# Patient Record
Sex: FEMALE | Race: WHITE | NOT HISPANIC OR LATINO | URBAN - METROPOLITAN AREA
[De-identification: names, ages, dates, MRNs, and addresses within clinical notes are randomized per-mention and may not be internally consistent; named-entity substitution may affect disease eponyms.]

---

## 2020-07-25 ENCOUNTER — TELEPHONE ENCOUNTER (OUTPATIENT)
Dept: URBAN - METROPOLITAN AREA CLINIC 13 | Facility: CLINIC | Age: 60
End: 2020-07-25

## 2020-07-25 RX ORDER — PROGESTERONE 200 MG/1
TAKE 1 CAPSULE DAILY CAPSULE, LIQUID FILLED ORAL
Refills: 0 | OUTPATIENT
Start: 2013-09-10 | End: 2016-11-29

## 2020-07-25 RX ORDER — ADALIMUMAB 40MG/0.4ML
INJECT SQ WEEKLY AS DIRECTED KIT SUBCUTANEOUS
Qty: 12 | Refills: 3 | OUTPATIENT
Start: 2019-02-08 | End: 2019-12-12

## 2020-07-25 RX ORDER — CYANOCOBALAMIN, ISOPROPYL ALCOHOL 1000MCG/ML
INJECT 1 ML WEEKLY ONCE A WEEK X 4 WEEKS, THEN ONCE A MONTH KIT INJECTION
Qty: 4 | Refills: 1 | OUTPATIENT
Start: 2018-12-06 | End: 2019-12-12

## 2020-07-25 RX ORDER — AZITHROMYCIN DIHYDRATE 250 MG/1
TAKE 2 TABLETS ON DAY 1 THEN TAKE 1 TABLET A DAY FOR 4 DAYS TABLET, FILM COATED ORAL
Qty: 6 | Refills: 0 | OUTPATIENT
Start: 2012-12-16 | End: 2013-02-05

## 2020-07-25 RX ORDER — CLOTRIMAZOLE 10 MG/1
ALLOW 1 TROCHE TO DISSOLVE SLOWLY IN MOUTH 4 TIMES DAILY LOZENGE ORAL
Qty: 56 | Refills: 0 | OUTPATIENT
Start: 2012-05-25 | End: 2012-06-14

## 2020-07-25 RX ORDER — ESTRADIOL 0.75 MG/1.25G
APPLY DAILY AS DIRECTED GEL, METERED TOPICAL
Qty: 50 | Refills: 0 | OUTPATIENT
Start: 2013-09-11 | End: 2015-12-15

## 2020-07-25 RX ORDER — DEXLANSOPRAZOLE 60 MG/1
TAKE 1 CAPSULE DAILY EVERY MORNING BEFORE BREAKFAST CAPSULE, DELAYED RELEASE ORAL
Qty: 30 | Refills: 5 | OUTPATIENT
Start: 2015-12-15 | End: 2016-03-18

## 2020-07-25 RX ORDER — PHENOBARBITAL, HYOSCYAMINE SULFATE, ATROPINE SULFATE, SCOPOLAMINE HYDROBROMIDE 16.2; .1037; .0194; .0065 MG/1; MG/1; MG/1; MG/1
TAKE 1 TABLET AS NEEDED TABLET ORAL
Refills: 0 | OUTPATIENT
Start: 2009-07-22 | End: 2010-09-15

## 2020-07-25 RX ORDER — DEXLANSOPRAZOLE 30 MG/1
TAKE 1 CAPSULE DAILY EVERY MORNING BEFORE BREAKFAST CAPSULE, DELAYED RELEASE ORAL
Refills: 0 | OUTPATIENT
End: 2012-09-10

## 2020-07-25 RX ORDER — MESALAMINE 800 MG/1
TAKE TWO TABLETS BY MOUTH THREE TIMES A DAY TABLET, DELAYED RELEASE ORAL
Qty: 180 | Refills: 1 | OUTPATIENT
Start: 2013-01-02 | End: 2013-08-19

## 2020-07-25 RX ORDER — CONJUGATED ESTROGENS 0.62 MG/G
INSERT 0.5 GRAM INTRAVAGINALLY DAILY. ( 3 WEEKS ON. 1 WEEK OFF ) CREAM VAGINAL
Refills: 0 | OUTPATIENT
Start: 2019-05-07 | End: 2019-12-12

## 2020-07-25 RX ORDER — AZITHROMYCIN DIHYDRATE 250 MG/1
TAKE 2 TABLETS ON DAY 1 THEN TAKE 1 TABLET A DAY FOR 4 DAYS TABLET, FILM COATED ORAL
Qty: 1 | Refills: 0 | OUTPATIENT
Start: 2016-03-18 | End: 2016-11-29

## 2020-07-25 RX ORDER — LEVOTHYROXINE SODIUM 0.05 MG/1
TAKE 1 TABLET DAILY TABLET ORAL
Refills: 0 | OUTPATIENT
Start: 2013-09-10 | End: 2019-12-12

## 2020-07-25 RX ORDER — CALCIUM CARBONATE 500 MG/1
TAKE 1 TABLET DAILY PRN TABLET, CHEWABLE ORAL
Refills: 0 | OUTPATIENT
End: 2019-12-12

## 2020-07-25 RX ORDER — NITAZOXANIDE 500 MG/1
TAKE 1 TABLET TWICE DAILY FOR 14 DAYS TABLET ORAL
Qty: 28 | Refills: 0 | OUTPATIENT
Start: 2012-05-25 | End: 2012-05-25

## 2020-07-25 RX ORDER — ADALIMUMAB 40MG/0.8ML
INJECT 40 MG UNDER THE SKIN EVERY WEEK KIT SUBCUTANEOUS
Qty: 2 | Refills: 3 | OUTPATIENT
Start: 2015-09-04 | End: 2019-05-09

## 2020-07-25 RX ORDER — PREDNISONE 10 MG/1
TAKE 3 TABLET DAILY TABLET ORAL
Qty: 90 | Refills: 0 | OUTPATIENT
Start: 2013-01-02 | End: 2013-05-06

## 2020-07-25 RX ORDER — MESALAMINE 500 MG/1
TAKE 4 CAPSULES TWICE DAILY CAPSULE ORAL
Qty: 240 | Refills: 3 | OUTPATIENT
Start: 2013-11-13 | End: 2013-12-30

## 2020-07-25 RX ORDER — ESOMEPRAZOLE MAGNESIUM 40 MG/1
TAKE 1 CAPSULE ONCE DAILY CAPSULE, DELAYED RELEASE PELLETS ORAL
Qty: 30 | Refills: 5 | OUTPATIENT
Start: 2016-03-18 | End: 2016-03-18

## 2020-07-25 RX ORDER — CERTOLIZUMAB PEGOL 400 MG
TAKE 1 ML AS NEEDED KIT SUBCUTANEOUS
Refills: 0 | OUTPATIENT
Start: 2009-07-22 | End: 2009-08-11

## 2020-07-25 RX ORDER — CYANOCOBALAMIN 1000 UG/ML
INJECT 1 ML WEEKLY ONCE A WEEK X 4 WEEKS, THEN ONCE A MONTH INJECTION INTRAMUSCULAR; SUBCUTANEOUS
Qty: 4 | Refills: 1 | OUTPATIENT
Start: 2019-01-29 | End: 2019-12-12

## 2020-07-25 RX ORDER — CELECOXIB 50 MG/1
TAKE CAPSULE  PRN CAPSULE ORAL
Refills: 0 | OUTPATIENT
End: 2012-06-14

## 2020-07-25 RX ORDER — MESALAMINE 1.2 G/1
TAKE 2 TABLET DAILY TABLET, DELAYED RELEASE ORAL
Qty: 24 | Refills: 0 | OUTPATIENT
Start: 2013-08-15 | End: 2013-09-16

## 2020-07-25 RX ORDER — HYOSCYAMINE SULFATE 0.12 MG/1
PLACE 1-2 TABLET EVERY 6 HOURS PRN ABD PAIN TABLET, ORALLY DISINTEGRATING ORAL
Qty: 90 | Refills: 3 | OUTPATIENT
Start: 2013-09-16 | End: 2014-03-21

## 2020-07-25 RX ORDER — AMOXICILLIN AND CLAVULANATE POTASSIUM 875; 125 MG/1; MG/1
TAKE 1 TABLET TWICE DAILY TABLET, FILM COATED ORAL
Qty: 28 | Refills: 0 | OUTPATIENT
Start: 2012-02-29 | End: 2013-08-13

## 2020-07-25 RX ORDER — THYROID, PORCINE 90 MG/1
TABLET ORAL
Qty: 30 | Refills: 0 | OUTPATIENT
Start: 2012-07-31 | End: 2012-12-10

## 2020-07-25 RX ORDER — RIFAXIMIN 550 MG/1
TAKE 1 TABLET 3 TIMES DAILY TABLET ORAL
Qty: 42 | Refills: 2 | OUTPATIENT
Start: 2019-05-13 | End: 2019-12-12

## 2020-07-25 RX ORDER — PHENOBARBITAL, HYOSCYAMINE SULFATE, ATROPINE SULFATE, SCOPOLAMINE HYDROBROMIDE 16.2; .1037; .0194; .0065 MG/1; MG/1; MG/1; MG/1
TAKE 1 TABLET AS NEEDED UNKNOWN STRENGTH TABLET ORAL
Refills: 0 | OUTPATIENT
Start: 2010-09-15 | End: 2011-05-18

## 2020-07-25 RX ORDER — ESOMEPRAZOLE MAGNESIUM 40 MG
TAKE 1 CAPSULE DAILY CAPSULE,DELAYED RELEASE (ENTERIC COATED) ORAL
Qty: 30 | Refills: 6 | OUTPATIENT
Start: 2013-10-29 | End: 2015-12-15

## 2020-07-25 RX ORDER — PREDNISONE 10 MG/1
TAKE 4 TABLETS DAILY TABLET ORAL
Qty: 120 | Refills: 2 | OUTPATIENT
Start: 2012-04-11 | End: 2012-09-10

## 2020-07-25 RX ORDER — AMLODIPINE BESYLATE 5 MG/1
TAKE 1 TABLET DAILY TABLET ORAL
Refills: 0 | OUTPATIENT
Start: 2013-04-21 | End: 2017-06-09

## 2020-07-25 RX ORDER — OMEPRAZOLE 40 MG/1
TAKE 1 CAPSULE DAILY CAPSULE, DELAYED RELEASE ORAL
Qty: 30 | Refills: 5 | OUTPATIENT
Start: 2016-03-18 | End: 2018-12-06

## 2020-07-26 ENCOUNTER — TELEPHONE ENCOUNTER (OUTPATIENT)
Dept: URBAN - METROPOLITAN AREA CLINIC 13 | Facility: CLINIC | Age: 60
End: 2020-07-26

## 2020-07-26 RX ORDER — FLUCONAZOLE 150 MG/1
TAKE ONE TABLET NOW TABLET ORAL
Qty: 1 | Refills: 0 | Status: ACTIVE | COMMUNITY
Start: 2012-08-01

## 2020-07-26 RX ORDER — LEVOFLOXACIN 500 MG/1
TABLET, FILM COATED ORAL
Qty: 10 | Refills: 0 | Status: ACTIVE | COMMUNITY
Start: 2012-01-05

## 2020-07-26 RX ORDER — HYOSCYAMINE SULFATE 0.12 MG/1
TABLET ORAL
Qty: 90 | Refills: 0 | Status: ACTIVE | COMMUNITY
Start: 2013-09-16

## 2020-07-26 RX ORDER — HYDROCODONE BITARTRATE AND ACETAMINOPHEN 7.5; 325 MG/1; MG/1
TABLET ORAL
Qty: 45 | Refills: 0 | Status: ACTIVE | COMMUNITY
Start: 2014-08-18

## 2020-07-26 RX ORDER — OXYCODONE AND ACETAMINOPHEN 5; 325 MG/1; MG/1
TAKE 1 TO 2 TABLETS BY MOUTH EVERY 4 HOURS AS NEEDED FOR PAIN TABLET ORAL
Qty: 40 | Refills: 0 | Status: ACTIVE | COMMUNITY
Start: 2011-09-30

## 2020-07-26 RX ORDER — AMLODIPINE BESYLATE 5 MG/1
TAKE 1 TABLET DAILY TABLET ORAL
Qty: 1 | Refills: 3 | Status: ACTIVE | COMMUNITY

## 2020-07-26 RX ORDER — AMLODIPINE BESYLATE 5 MG/1
TAKE 1 TABLET BY MOUTH EVERY DAY TABLET ORAL
Qty: 30 | Refills: 0 | Status: ACTIVE | COMMUNITY
Start: 2013-03-26

## 2020-07-26 RX ORDER — TRIAZOLAM 0.25 MG/1
TABLET ORAL
Qty: 5 | Refills: 0 | Status: ACTIVE | COMMUNITY
Start: 2019-07-15

## 2020-07-26 RX ORDER — TRIAMCINOLONE ACETONIDE 1 MG/G
CREAM TOPICAL
Qty: 255 | Refills: 0 | Status: ACTIVE | COMMUNITY
Start: 2013-03-29

## 2020-07-26 RX ORDER — ADALIMUMAB 40MG/0.8ML
KIT SUBCUTANEOUS
Qty: 4 | Refills: 0 | Status: ACTIVE | COMMUNITY
Start: 2013-11-01

## 2020-07-26 RX ORDER — FLUCONAZOLE 100 MG/1
TAKE 1 TABLET EVERY DAY FOR 10 DAYS TABLET ORAL
Qty: 10 | Refills: 0 | Status: ACTIVE | COMMUNITY
Start: 2011-07-19

## 2020-07-26 RX ORDER — CLINDAMYCIN PHOSPHATE 100 MG/1
SUPPOSITORY VAGINAL
Qty: 3 | Refills: 0 | Status: ACTIVE | COMMUNITY
Start: 2011-05-10

## 2020-07-26 RX ORDER — AMOXICILLIN 875 MG/1
TABLET, FILM COATED ORAL
Qty: 28 | Refills: 0 | Status: ACTIVE | COMMUNITY
Start: 2012-05-25

## 2020-07-26 RX ORDER — CONJUGATED ESTROGENS 0.62 MG/G
CREAM VAGINAL
Qty: 30 | Refills: 0 | Status: ACTIVE | COMMUNITY
Start: 2018-04-05

## 2020-07-26 RX ORDER — AMOXICILLIN 500 MG/1
CAPSULE ORAL
Qty: 21 | Refills: 0 | Status: ACTIVE | COMMUNITY
Start: 2019-12-02

## 2020-07-26 RX ORDER — PANTOPRAZOLE SODIUM 40 MG/1
TAKE 1 TABLET TWICE DAILY 30 MINUTES BEFORE BREAKFAST AND DINNER TABLET, DELAYED RELEASE ORAL
Qty: 60 | Refills: 3 | Status: ACTIVE | COMMUNITY
Start: 2020-05-21

## 2020-07-26 RX ORDER — ETODOLAC 400 MG/1
TABLET, COATED ORAL
Qty: 20 | Refills: 0 | Status: ACTIVE | COMMUNITY
Start: 2019-07-15

## 2020-07-26 RX ORDER — BUTALBITAL, ACETAMINOPHEN, AND CAFFEINE 50; 325; 40 MG/1; MG/1; MG/1
TAKE ONE TABLET EVERY 6 HOURS AS NEEDED FOR HEADACHE TABLET ORAL
Qty: 30 | Refills: 0 | Status: ACTIVE | COMMUNITY
Start: 2011-09-06

## 2020-07-26 RX ORDER — ADALIMUMAB 40MG/0.4ML
INJECT 40MG WEEKLY KIT SUBCUTANEOUS
Qty: 1 | Refills: 11 | Status: ACTIVE | COMMUNITY
Start: 2020-03-06

## 2020-07-26 RX ORDER — OSELTAMIVIR PHOSPHATE 75 MG/1
CAPSULE ORAL
Qty: 10 | Refills: 0 | Status: ACTIVE | COMMUNITY
Start: 2018-01-12

## 2020-07-26 RX ORDER — AZITHROMYCIN DIHYDRATE 500 MG/1
TABLET, FILM COATED ORAL
Qty: 3 | Refills: 0 | Status: ACTIVE | COMMUNITY
Start: 2018-01-12

## 2020-07-26 RX ORDER — MELOXICAM 7.5 MG/1
TABLET ORAL
Qty: 30 | Refills: 0 | Status: ACTIVE | COMMUNITY
Start: 2012-02-02

## 2020-07-26 RX ORDER — CERTOLIZUMAB PEGOL 200 MG/ML
INJECTION, SOLUTION SUBCUTANEOUS
Qty: 1 | Refills: 0 | Status: ACTIVE | COMMUNITY
Start: 2012-10-01

## 2020-07-26 RX ORDER — FLUCONAZOLE 150 MG/1
TABLET ORAL
Qty: 7 | Refills: 0 | Status: ACTIVE | COMMUNITY
Start: 2012-01-31

## 2020-07-26 RX ORDER — PROGESTERONE 200 MG/1
CAPSULE, LIQUID FILLED ORAL
Qty: 90 | Refills: 0 | Status: ACTIVE | COMMUNITY
Start: 2013-09-10

## 2020-07-26 RX ORDER — OXYCODONE AND ACETAMINOPHEN 5; 325 MG/1; MG/1
TAKE 1 TO 2 TABLETS BY MOUTH EVERY 3 TO 4 HOURS AS NEEDED FOR PAIN TABLET ORAL
Qty: 40 | Refills: 0 | Status: ACTIVE | COMMUNITY
Start: 2012-07-10

## 2020-07-26 RX ORDER — DICLOFENAC SODIUM 1 %
APPLY THREE TIMES A DAY GEL (GRAM) TOPICAL
Qty: 255 | Refills: 0 | Status: ACTIVE | COMMUNITY
Start: 2011-07-06

## 2020-08-26 ENCOUNTER — ERX REFILL RESPONSE (OUTPATIENT)
Age: 60
End: 2020-08-26

## 2020-08-26 RX ORDER — ADALIMUMAB 40MG/0.4ML
INJECT 1 SYRINGE UNDER THE SKIN EVERY 7 DAYS KIT SUBCUTANEOUS
Qty: 4 | Refills: 5

## 2020-10-08 ENCOUNTER — TELEPHONE ENCOUNTER (OUTPATIENT)
Dept: URBAN - METROPOLITAN AREA CLINIC 113 | Facility: CLINIC | Age: 60
End: 2020-10-08

## 2020-10-09 ENCOUNTER — OFFICE VISIT (OUTPATIENT)
Dept: URBAN - METROPOLITAN AREA CLINIC 113 | Facility: CLINIC | Age: 60
End: 2020-10-09

## 2020-12-18 ENCOUNTER — OFFICE VISIT (OUTPATIENT)
Dept: URBAN - METROPOLITAN AREA CLINIC 113 | Facility: CLINIC | Age: 60
End: 2020-12-18
Payer: COMMERCIAL

## 2020-12-18 ENCOUNTER — WEB ENCOUNTER (OUTPATIENT)
Dept: URBAN - METROPOLITAN AREA CLINIC 113 | Facility: CLINIC | Age: 60
End: 2020-12-18

## 2020-12-18 VITALS
WEIGHT: 158 LBS | TEMPERATURE: 98.8 F | SYSTOLIC BLOOD PRESSURE: 138 MMHG | HEART RATE: 11 BPM | DIASTOLIC BLOOD PRESSURE: 92 MMHG | BODY MASS INDEX: 26.33 KG/M2 | HEIGHT: 65 IN | RESPIRATION RATE: 18 BRPM

## 2020-12-18 DIAGNOSIS — Z79.899 HIGH RISK MEDICATIONS (NOT ANTICOAGULANTS) LONG-TERM USE: ICD-10-CM

## 2020-12-18 DIAGNOSIS — E53.8 B12 DEFICIENCY: ICD-10-CM

## 2020-12-18 DIAGNOSIS — E55.9 VITAMIN D DEFICIENCY: ICD-10-CM

## 2020-12-18 DIAGNOSIS — K50.813 CROHN'S DISEASE OF BOTH SMALL AND LARGE INTESTINE WITH FISTULA: ICD-10-CM

## 2020-12-18 PROCEDURE — 99214 OFFICE O/P EST MOD 30 MIN: CPT | Performed by: INTERNAL MEDICINE

## 2020-12-18 RX ORDER — AMLODIPINE BESYLATE 5 MG/1
TAKE 1 TABLET DAILY TABLET ORAL
Qty: 1 | Refills: 3 | Status: ACTIVE | COMMUNITY

## 2020-12-18 RX ORDER — BUTALBITAL, ACETAMINOPHEN, AND CAFFEINE 50; 325; 40 MG/1; MG/1; MG/1
TAKE ONE TABLET EVERY 6 HOURS AS NEEDED FOR HEADACHE TABLET ORAL
Qty: 30 | Refills: 0 | Status: ACTIVE | COMMUNITY
Start: 2011-09-06

## 2020-12-18 RX ORDER — ADALIMUMAB 40MG/0.8ML
KIT SUBCUTANEOUS
Qty: 4 | Refills: 0 | Status: ACTIVE | COMMUNITY
Start: 2013-11-01

## 2020-12-18 RX ORDER — ADALIMUMAB 40MG/0.8ML
KIT SUBCUTANEOUS
OUTPATIENT
Start: 2013-11-01

## 2020-12-18 NOTE — HPI-OTHER HISTORIES
Colonoscopy (4/10/18): perianal fistula, perianal skin tags and anal canal stenosis stable from previous studies, patent ileocolonic anastomosis at 45 cm from the anal verge with mild stenosis and ulceration overall improved from her previous examination, normal neoterminal ilium, sigmoid colon diverticulosis. Biopsies of the anastomotic ulcer showed changes consistent with anastomosis without evidence of dysplasia or malignancy, and random colon biopsy showed no significant abnormality.

## 2020-12-18 NOTE — HPI-TODAY'S VISIT:
Ms. Chaves is a 60-year-old woman with a history of small bowel, colonic and perianal Crohn's disease status post ileocecal resection on Humira 40 mg weekly presenting for follow-up.  She was last seen on 12/12/19 for routine follow-up regarding her perianal and ileocolonic Crohn's disease status post right hemicolectomy (2014) there was doing well bedtime on Humira 40 mg weekly.  Her labs were unremarkable except for a mildly low vitamin D level and an isolated mild elevation ALT.  She was to continue vitamin D 2000 units daily, and continue Humira 40 mg weekly.  Plan was to follow up in 6 months.  She returns now after moving back to [place] from [place].  Her interval history is notable for eliseo COVID-19.  She is taking Humira 40 mg injection every 10 days.  She is overall doing fairly well with occasional right upper quadrant pain that improves taking Bentyl.  She has about 6 loose bowel movements per day with some urgency and rare episodes of fecal incontinence.  She occasionally uses Imodium.  No BRBPR or melena.  She denies any NSAID use.  She also denies any heartburn or dysphagia.

## 2021-01-10 ENCOUNTER — LAB OUTSIDE AN ENCOUNTER (OUTPATIENT)
Dept: URBAN - METROPOLITAN AREA CLINIC 113 | Facility: CLINIC | Age: 61
End: 2021-01-10

## 2021-01-10 ENCOUNTER — TELEPHONE ENCOUNTER (OUTPATIENT)
Dept: URBAN - METROPOLITAN AREA CLINIC 113 | Facility: CLINIC | Age: 61
End: 2021-01-10

## 2021-01-10 RX ORDER — ADALIMUMAB 40MG/0.8ML
KIT SUBCUTANEOUS
Status: ACTIVE | COMMUNITY
Start: 2013-11-01

## 2021-01-10 RX ORDER — BUTALBITAL, ACETAMINOPHEN, AND CAFFEINE 50; 325; 40 MG/1; MG/1; MG/1
TAKE ONE TABLET EVERY 6 HOURS AS NEEDED FOR HEADACHE TABLET ORAL
Qty: 30 | Refills: 0 | Status: ACTIVE | COMMUNITY
Start: 2011-09-06

## 2021-01-10 RX ORDER — AMLODIPINE BESYLATE 5 MG/1
TAKE 1 TABLET DAILY TABLET ORAL
Qty: 1 | Refills: 3 | Status: ACTIVE | COMMUNITY

## 2021-01-10 RX ORDER — SODIUM, POTASSIUM,MAG SULFATES 17.5-3.13G
354 ML SOLUTION, RECONSTITUTED, ORAL ORAL
Qty: 354 ML | Refills: 0 | OUTPATIENT

## 2021-01-13 LAB
A/G RATIO: 1.8
ALBUMIN: 4.2
ALKALINE PHOSPHATASE: 96
ALT (SGPT): 29
AST (SGOT): 30
BASO (ABSOLUTE): 0.1
BASOS: 1
BILIRUBIN, TOTAL: 0.7
BUN/CREATININE RATIO: 17
BUN: 17
C-REACTIVE PROTEIN, QUANT: <1
CALCIUM: 9.2
CARBON DIOXIDE, TOTAL: 19
CHLORIDE: 104
CREATININE: 1.01
EGFR IF AFRICN AM: 71
EGFR IF NONAFRICN AM: 62
EOS (ABSOLUTE): 0.1
EOS: 2
GLOBULIN, TOTAL: 2.4
GLUCOSE: 116
HEMATOCRIT: 43.6
HEMATOLOGY COMMENTS:: (no result)
HEMOGLOBIN: 15
IMMATURE CELLS: (no result)
IMMATURE GRANS (ABS): 0
IMMATURE GRANULOCYTES: 0
LYMPHS (ABSOLUTE): 2.2
LYMPHS: 44
MCH: 33.3
MCHC: 34.4
MCV: 97
MONOCYTES(ABSOLUTE): 0.6
MONOCYTES: 12
NEUTROPHILS (ABSOLUTE): 2
NEUTROPHILS: 41
NRBC: (no result)
PLATELETS: 323
POTASSIUM: 4.5
PROTEIN, TOTAL: 6.6
RBC: 4.5
RDW: 12.5
SODIUM: 137
VITAMIN B12: 714
VITAMIN D, 25-HYDROXY: 28
WBC: 4.9

## 2021-02-02 ENCOUNTER — OFFICE VISIT (OUTPATIENT)
Dept: URBAN - METROPOLITAN AREA SURGERY CENTER 25 | Facility: SURGERY CENTER | Age: 61
End: 2021-02-02
Payer: COMMERCIAL

## 2021-02-02 ENCOUNTER — CLAIMS CREATED FROM THE CLAIM WINDOW (OUTPATIENT)
Dept: URBAN - METROPOLITAN AREA CLINIC 4 | Facility: CLINIC | Age: 61
End: 2021-02-02
Payer: COMMERCIAL

## 2021-02-02 DIAGNOSIS — K63.89 OTHER SPECIFIED DISEASES OF INTESTINE: ICD-10-CM

## 2021-02-02 DIAGNOSIS — K63.3 APHTHOUS ULCER OF COLON: ICD-10-CM

## 2021-02-02 DIAGNOSIS — K52.9 ENTEROCOLITIS: ICD-10-CM

## 2021-02-02 DIAGNOSIS — K63.3 ULCER OF INTESTINE: ICD-10-CM

## 2021-02-02 DIAGNOSIS — K50.112 CROHN'S COLITIS, WITH INTESTINAL OBSTRUCTION: ICD-10-CM

## 2021-02-02 PROCEDURE — 88342 IMHCHEM/IMCYTCHM 1ST ANTB: CPT | Performed by: PATHOLOGY

## 2021-02-02 PROCEDURE — G8907 PT DOC NO EVENTS ON DISCHARG: HCPCS | Performed by: INTERNAL MEDICINE

## 2021-02-02 PROCEDURE — 88305 TISSUE EXAM BY PATHOLOGIST: CPT | Performed by: PATHOLOGY

## 2021-02-02 PROCEDURE — 45380 COLONOSCOPY AND BIOPSY: CPT | Performed by: INTERNAL MEDICINE

## 2021-02-02 PROCEDURE — 88341 IMHCHEM/IMCYTCHM EA ADD ANTB: CPT | Performed by: PATHOLOGY

## 2021-02-02 RX ORDER — BUTALBITAL, ACETAMINOPHEN, AND CAFFEINE 50; 325; 40 MG/1; MG/1; MG/1
TAKE ONE TABLET EVERY 6 HOURS AS NEEDED FOR HEADACHE TABLET ORAL
Qty: 30 | Refills: 0 | Status: ACTIVE | COMMUNITY
Start: 2011-09-06

## 2021-02-02 RX ORDER — ADALIMUMAB 40MG/0.8ML
KIT SUBCUTANEOUS
Status: ACTIVE | COMMUNITY
Start: 2013-11-01

## 2021-02-02 RX ORDER — SODIUM, POTASSIUM,MAG SULFATES 17.5-3.13G
354 ML SOLUTION, RECONSTITUTED, ORAL ORAL
Qty: 354 ML | Refills: 0 | Status: ACTIVE | COMMUNITY

## 2021-02-02 RX ORDER — AMLODIPINE BESYLATE 5 MG/1
TAKE 1 TABLET DAILY TABLET ORAL
Qty: 1 | Refills: 3 | Status: ACTIVE | COMMUNITY

## 2021-03-15 ENCOUNTER — TELEPHONE ENCOUNTER (OUTPATIENT)
Dept: URBAN - METROPOLITAN AREA CLINIC 113 | Facility: CLINIC | Age: 61
End: 2021-03-15

## 2021-03-15 RX ORDER — ADALIMUMAB 40MG/0.8ML
INJECT ONE PEN KIT SUBCUTANEOUS WEEKLY
Qty: 4 | Refills: 6
Start: 2013-11-01 | End: 2021-10-11

## 2021-04-08 ENCOUNTER — OFFICE VISIT (OUTPATIENT)
Dept: URBAN - METROPOLITAN AREA CLINIC 113 | Facility: CLINIC | Age: 61
End: 2021-04-08
Payer: COMMERCIAL

## 2021-04-08 VITALS
DIASTOLIC BLOOD PRESSURE: 82 MMHG | HEIGHT: 65 IN | WEIGHT: 161 LBS | HEART RATE: 92 BPM | BODY MASS INDEX: 26.82 KG/M2 | TEMPERATURE: 97.5 F | SYSTOLIC BLOOD PRESSURE: 124 MMHG

## 2021-04-08 DIAGNOSIS — Z79.899 HIGH RISK MEDICATIONS (NOT ANTICOAGULANTS) LONG-TERM USE: ICD-10-CM

## 2021-04-08 DIAGNOSIS — E55.9 VITAMIN D DEFICIENCY: ICD-10-CM

## 2021-04-08 DIAGNOSIS — K50.813 CROHN'S DISEASE OF BOTH SMALL AND LARGE INTESTINE WITH FISTULA: ICD-10-CM

## 2021-04-08 PROCEDURE — 99214 OFFICE O/P EST MOD 30 MIN: CPT | Performed by: INTERNAL MEDICINE

## 2021-04-08 RX ORDER — AMLODIPINE BESYLATE 5 MG/1
TAKE 1 TABLET DAILY TABLET ORAL
Qty: 1 | Refills: 3 | Status: ACTIVE | COMMUNITY

## 2021-04-08 RX ORDER — ADALIMUMAB 40MG/0.8ML
INJECT ONE PEN KIT SUBCUTANEOUS WEEKLY
Qty: 4 | Refills: 6 | Status: ACTIVE | COMMUNITY
Start: 2013-11-01 | End: 2021-10-11

## 2021-04-08 RX ORDER — SODIUM, POTASSIUM,MAG SULFATES 17.5-3.13G
354 ML SOLUTION, RECONSTITUTED, ORAL ORAL
Qty: 354 ML | Refills: 0 | Status: ON HOLD | COMMUNITY

## 2021-04-08 RX ORDER — ADALIMUMAB 40MG/0.4ML
1 SUBQ Q  WEEK KIT SUBCUTANEOUS
Qty: 4 | Refills: 11 | OUTPATIENT
Start: 2021-04-08 | End: 2022-04-03

## 2021-04-08 RX ORDER — BUTALBITAL, ACETAMINOPHEN, AND CAFFEINE 50; 325; 40 MG/1; MG/1; MG/1
TAKE ONE TABLET EVERY 6 HOURS AS NEEDED FOR HEADACHE TABLET ORAL
Qty: 30 | Refills: 0 | Status: ON HOLD | COMMUNITY
Start: 2011-09-06

## 2021-04-08 NOTE — HPI-TODAY'S VISIT:
Ms. Chaves is a 60-year-old woman with a history of small bowel, colonic and perianal Crohn's disease status post ileocecal resection on Humira 40 mg weekly presenting for follow-up after colonoscopy. The colonoscopy was performed on 2/2/2021 with findings notable for perianal skin tags but no evidence of active perianal Crohn's disease, stable anal stricture, and ileocolonic anastomosis with erythema, ulceration and moderate stenosis could not be readily traversed status post biopsy showing chronic active enterocolitis negative for dysplasia, normal neoterminal ileum on limited evaluation, patchy erythema of the right and left colon status post biopsy showing no significant abnormality, sigmoid colon diverticulosis. She is overall doing fairly well.  She is occasional right upper quadrant abdominal pain that waxes and wanes, nonradiating, and has no modifying factors.  She continues have episodes of fecal seepage and loose bowel movements with urgency.  She estimates about 6-7 bowel movements per day.  She typically will take one half dose of liquid Imodium prior to certain functions such as playing golf.  She is taking Humira 40 mg about every 10 days.  She has not had any issues with the perianal fistula for about 1 year.  She was previously treated with Xifaxan for the diarrhea and reports no improvement. Labs on 1/12/2021 show WBC 4.9, hemoglobin 15, MCV 97, platelets 323, normal basic metabolic panel, normal liver function tests, vitamin D 25 hydroxy 28, vitamin B12 714, C-reactive protein <1.

## 2021-04-14 ENCOUNTER — ERX REFILL RESPONSE (OUTPATIENT)
Dept: URBAN - METROPOLITAN AREA CLINIC 113 | Facility: CLINIC | Age: 61
End: 2021-04-14

## 2021-04-14 RX ORDER — ADALIMUMAB 40MG/0.8ML
INJECT 1 PEN UNDER THE SKIN EVERY 7 DAYS KIT SUBCUTANEOUS
Qty: 4 | Refills: 5

## 2021-05-19 ENCOUNTER — ERX REFILL RESPONSE (OUTPATIENT)
Dept: URBAN - METROPOLITAN AREA CLINIC 113 | Facility: CLINIC | Age: 61
End: 2021-05-19

## 2021-05-19 RX ORDER — PANTOPRAZOLE SODIUM 40 MG/1
TAKE ONE TABLET BY MOUTH TWICE A DAY 30 MINUTES BEFORE BREAKFAST AND DINNER TABLET, DELAYED RELEASE ORAL
Qty: 60 | Refills: 2

## 2021-06-08 ENCOUNTER — TELEPHONE ENCOUNTER (OUTPATIENT)
Dept: URBAN - METROPOLITAN AREA CLINIC 113 | Facility: CLINIC | Age: 61
End: 2021-06-08

## 2021-06-08 RX ORDER — ADALIMUMAB 40MG/0.4ML
1 SUBQ Q  WEEK KIT SUBCUTANEOUS
Qty: 4 | Refills: 11 | Status: ACTIVE | COMMUNITY
Start: 2021-04-08 | End: 2022-04-03

## 2021-06-08 RX ORDER — PANTOPRAZOLE SODIUM 40 MG/1
TAKE ONE TABLET BY MOUTH TWICE A DAY 30 MINUTES BEFORE BREAKFAST AND DINNER TABLET, DELAYED RELEASE ORAL
Qty: 60 | Refills: 2 | Status: ACTIVE | COMMUNITY

## 2021-06-08 RX ORDER — BUTALBITAL, ACETAMINOPHEN, AND CAFFEINE 50; 325; 40 MG/1; MG/1; MG/1
TAKE ONE TABLET EVERY 6 HOURS AS NEEDED FOR HEADACHE TABLET ORAL
Qty: 30 | Refills: 0 | Status: DISCONTINUED | COMMUNITY
Start: 2011-09-06

## 2021-06-08 RX ORDER — ADALIMUMAB 40MG/0.8ML
INJECT 1 PEN UNDER THE SKIN EVERY 7 DAYS KIT SUBCUTANEOUS
Qty: 4 | Refills: 5 | Status: DISCONTINUED | COMMUNITY

## 2021-06-08 RX ORDER — AMLODIPINE BESYLATE 5 MG/1
TAKE 1 TABLET DAILY TABLET ORAL
Qty: 1 | Refills: 3 | Status: ACTIVE | COMMUNITY

## 2021-06-08 RX ORDER — SODIUM, POTASSIUM,MAG SULFATES 17.5-3.13G
354 ML SOLUTION, RECONSTITUTED, ORAL ORAL
Qty: 354 ML | Refills: 0 | Status: DISCONTINUED | COMMUNITY

## 2021-06-08 RX ORDER — ADALIMUMAB 40MG/0.4ML
AS DIRECTED KIT SUBCUTANEOUS
Qty: 4 | Refills: 11
Start: 2021-04-08 | End: 2022-06-03

## 2021-06-14 ENCOUNTER — LAB OUTSIDE AN ENCOUNTER (OUTPATIENT)
Dept: URBAN - METROPOLITAN AREA CLINIC 113 | Facility: CLINIC | Age: 61
End: 2021-06-14

## 2021-07-01 LAB
A/G RATIO: 1.5
ALBUMIN: 4.3
ALKALINE PHOSPHATASE: 101
ALT (SGPT): 29
AST (SGOT): 30
BILIRUBIN, TOTAL: 0.6
BUN/CREATININE RATIO: 12
BUN: 12
C-REACTIVE PROTEIN, QUANT: 1
CALCIUM: 9.5
CARBON DIOXIDE, TOTAL: 21
CHLORIDE: 107
CREATININE: 1
EGFR IF AFRICN AM: 72
EGFR IF NONAFRICN AM: 62
GLOBULIN, TOTAL: 2.9
GLUCOSE: 101
HEMATOCRIT: 45.4
HEMOGLOBIN: 15.1
MCH: 32.8
MCHC: 33.3
MCV: 99
NRBC: (no result)
PLATELETS: 345
POTASSIUM: 4.8
PROTEIN, TOTAL: 7.2
RBC: 4.61
RDW: 12.3
SODIUM: 139
VITAMIN D, 25-HYDROXY: 30.4
WBC: 6.6

## 2021-08-25 ENCOUNTER — ERX REFILL RESPONSE (OUTPATIENT)
Dept: URBAN - METROPOLITAN AREA CLINIC 113 | Facility: CLINIC | Age: 61
End: 2021-08-25

## 2021-08-25 RX ORDER — PANTOPRAZOLE SODIUM 40 MG/1
TAKE ONE TABLET BY MOUTH TWICE A DAY 30 MINUTES BEFORE BREAKFAST AND DINNER TABLET, DELAYED RELEASE ORAL
Qty: 60 | Refills: 2 | OUTPATIENT

## 2021-08-25 RX ORDER — PANTOPRAZOLE SODIUM 40 MG/1
TAKE ONE TABLET BY MOUTH TWICE A DAY 30 MINUTES BEFORE BREAKFAST AND DINNER TABLET, DELAYED RELEASE ORAL
Qty: 60 TABLET | Refills: 6 | OUTPATIENT

## 2021-11-16 ENCOUNTER — ERX REFILL RESPONSE (OUTPATIENT)
Dept: URBAN - METROPOLITAN AREA CLINIC 107 | Facility: CLINIC | Age: 61
End: 2021-11-16

## 2021-11-16 RX ORDER — ADALIMUMAB 40MG/0.4ML
INJECT 1 PEN UNDER THE SKIN EVERY 7 DAYS KIT SUBCUTANEOUS
Qty: 4 | Refills: 5 | OUTPATIENT

## 2021-11-16 RX ORDER — ADALIMUMAB 40MG/0.4ML
AS DIRECTED KIT SUBCUTANEOUS
Qty: 4 | Refills: 11 | OUTPATIENT

## 2022-01-27 ENCOUNTER — TELEPHONE ENCOUNTER (OUTPATIENT)
Dept: URBAN - METROPOLITAN AREA CLINIC 113 | Facility: CLINIC | Age: 62
End: 2022-01-27

## 2022-01-30 LAB
A/G RATIO: 1.4
ALBUMIN: 4.1
ALKALINE PHOSPHATASE: 91
ALT (SGPT): 34
AST (SGOT): 34
BASO (ABSOLUTE): 0.1
BASOS: 1
BILIRUBIN, TOTAL: 0.6
BUN/CREATININE RATIO: 17
BUN: 14
C-REACTIVE PROTEIN, QUANT: 1
CALCIUM: 9
CARBON DIOXIDE, TOTAL: 25
CHLORIDE: 101
CREATININE: 0.82
EGFR IF AFRICN AM: 91
EGFR IF NONAFRICN AM: 79
EOS (ABSOLUTE): 0.1
EOS: 3
GLOBULIN, TOTAL: 3
GLUCOSE: 89
HEMATOCRIT: 41.2
HEMATOLOGY COMMENTS:: (no result)
HEMOGLOBIN: 13.6
IMMATURE CELLS: (no result)
IMMATURE GRANS (ABS): 0
IMMATURE GRANULOCYTES: 0
LYMPHS (ABSOLUTE): 2.5
LYMPHS: 47
MCH: 33.2
MCHC: 33
MCV: 101
MONOCYTES(ABSOLUTE): 0.5
MONOCYTES: 9
NEUTROPHILS (ABSOLUTE): 2.2
NEUTROPHILS: 40
NRBC: (no result)
PLATELETS: 287
POTASSIUM: 4.9
PROTEIN, TOTAL: 7.1
QUANTIFERON CRITERIA: (no result)
QUANTIFERON INCUBATION: (no result)
QUANTIFERON MITOGEN VALUE: >10
QUANTIFERON NIL VALUE: 0.03
QUANTIFERON TB1 AG VALUE: 0.01
QUANTIFERON TB2 AG VALUE: 0.03
QUANTIFERON-TB GOLD PLUS: NEGATIVE
RBC: 4.1
RDW: 12.4
SODIUM: 136
VITAMIN B12: 182
VITAMIN D, 25-HYDROXY: 25.9
WBC: 5.5

## 2022-01-31 ENCOUNTER — OFFICE VISIT (OUTPATIENT)
Dept: URBAN - METROPOLITAN AREA CLINIC 113 | Facility: CLINIC | Age: 62
End: 2022-01-31
Payer: COMMERCIAL

## 2022-01-31 VITALS
BODY MASS INDEX: 26.82 KG/M2 | HEART RATE: 107 BPM | WEIGHT: 161 LBS | SYSTOLIC BLOOD PRESSURE: 123 MMHG | HEIGHT: 65 IN | DIASTOLIC BLOOD PRESSURE: 86 MMHG | TEMPERATURE: 97.8 F | RESPIRATION RATE: 18 BRPM

## 2022-01-31 DIAGNOSIS — K21.9 GASTROESOPHAGEAL REFLUX DISEASE WITHOUT ESOPHAGITIS: ICD-10-CM

## 2022-01-31 DIAGNOSIS — E55.9 VITAMIN D DEFICIENCY: ICD-10-CM

## 2022-01-31 DIAGNOSIS — K50.813 CROHN'S DISEASE OF BOTH SMALL AND LARGE INTESTINE WITH FISTULA: ICD-10-CM

## 2022-01-31 DIAGNOSIS — E53.8 B12 DEFICIENCY: ICD-10-CM

## 2022-01-31 DIAGNOSIS — Z79.899 HIGH RISK MEDICATIONS (NOT ANTICOAGULANTS) LONG-TERM USE: ICD-10-CM

## 2022-01-31 DIAGNOSIS — K63.89 SMALL INTESTINAL BACTERIAL OVERGROWTH (SIBO): ICD-10-CM

## 2022-01-31 PROBLEM — 446081009: Status: ACTIVE | Noted: 2022-01-31

## 2022-01-31 PROCEDURE — 99214 OFFICE O/P EST MOD 30 MIN: CPT | Performed by: INTERNAL MEDICINE

## 2022-01-31 RX ORDER — ADALIMUMAB 40MG/0.4ML
INJECT 1 PEN UNDER THE SKIN EVERY 7 DAYS KIT SUBCUTANEOUS
Qty: 4 | Refills: 5 | Status: ACTIVE | COMMUNITY

## 2022-01-31 RX ORDER — ADALIMUMAB 40MG/0.4ML
INJECT 1 PEN UNDER THE SKIN EVERY 7 DAYS KIT SUBCUTANEOUS
OUTPATIENT

## 2022-01-31 RX ORDER — ERGOCALCIFEROL 1.25 MG/1
1 CAPSULE CAPSULE, LIQUID FILLED ORAL
Qty: 8 | Refills: 0 | OUTPATIENT
Start: 2022-01-31 | End: 2022-04-01

## 2022-01-31 RX ORDER — CYANOCOBALAMIN 1000 UG/ML
1 ML INJECTION INTRAMUSCULAR; SUBCUTANEOUS
Qty: 12 | Refills: 1 | OUTPATIENT
Start: 2022-01-31 | End: 2022-04-01

## 2022-01-31 RX ORDER — RIFAXIMIN 550 MG/1
1 TABLET TABLET ORAL THREE TIMES A DAY
Qty: 42 TABLET | Refills: 1 | OUTPATIENT
Start: 2022-01-31 | End: 2022-02-28

## 2022-01-31 RX ORDER — AMLODIPINE BESYLATE 5 MG/1
TAKE 1 TABLET DAILY TABLET ORAL
Qty: 1 | Refills: 3 | Status: ACTIVE | COMMUNITY

## 2022-01-31 RX ORDER — PANTOPRAZOLE SODIUM 40 MG/1
TAKE ONE TABLET BY MOUTH TWICE A DAY 30 MINUTES BEFORE BREAKFAST AND DINNER TABLET, DELAYED RELEASE ORAL
OUTPATIENT

## 2022-01-31 RX ORDER — PANTOPRAZOLE SODIUM 40 MG/1
TAKE ONE TABLET BY MOUTH TWICE A DAY 30 MINUTES BEFORE BREAKFAST AND DINNER TABLET, DELAYED RELEASE ORAL
Qty: 60 TABLET | Refills: 6 | Status: ACTIVE | COMMUNITY

## 2022-01-31 NOTE — HPI-OTHER HISTORIES
Colonoscopy (2/2/2021): notable for perianal skin tags but no evidence of active perianal Crohn's disease, stable anal stricture, and ileocolonic anastomosis with erythema, ulceration and moderate stenosis could not be readily traversed status post biopsy showing chronic active enterocolitis negative for dysplasia, normal neoterminal ileum on limited evaluation, patchy erythema of the right and left colon status post biopsy showing no significant abnormality, sigmoid colon diverticulosis.  Colonoscopy (4/10/18): perianal fistula, perianal skin tags and anal canal stenosis stable from previous studies, patent ileocolonic anastomosis at 45 cm from the anal verge with mild stenosis and ulceration overall improved from her previous examination, normal neoterminal ilium, sigmoid colon diverticulosis. Biopsies of the anastomotic ulcer showed changes consistent with anastomosis without evidence of dysplasia or malignancy, and random colon biopsy showed no significant abnormality.

## 2022-01-31 NOTE — HPI-TODAY'S VISIT:
Ms. Chaves is a 61-year-old woman with a history of small bowel, colonic and perianal Crohn's disease status post ileocecal resection on Humira 40 mg weekly presenting for routine follow up. She was last seen on 4/8/2021 for follow-up after undergoing colonoscopy for surveillance.  The colonoscopy was notable for evidence of mild anastomotic disease with stenosis and chronic perianal changes with stricture.  Biopsies showed no dysplasia.  She is recommended to use Imodium as needed for relief of diarrhea, consider retrial of bile acid binding agent.  She is recommended to continue Humira 40 mg daily. She returns for follow-up reporting fairly difficult last few months with an exacerbation of left-sided temporomandibular joint pain followed by eliseo influenza and coronavirus.  She reports taking Advil several times per day for relief of TMJ pain.  She then began to experience some right upper quadrant pain followed by black-colored stool for about 2 to 3 weeks.  She discontinued the Advil and she reports that the melena resolved about 1 week ago. She has not had any issues of perianal fistula since her last appointment.  She has been taking Humira every 7 days.  She typically has 7-8 looser bowel movements daily with occasional urgency.  She is taking Imodium 1 mg on an as needed basis.  She has some residual right upper quadrant pain and mild left lower quadrant pain.  She has had an excessive amount of gas and bloating.  She denies any heartburn taking pantoprazole 40 mg daily.  No regurgitation or dysphagia.

## 2022-02-24 ENCOUNTER — ERX REFILL RESPONSE (OUTPATIENT)
Dept: URBAN - METROPOLITAN AREA CLINIC 113 | Facility: CLINIC | Age: 62
End: 2022-02-24

## 2022-02-24 RX ORDER — PANTOPRAZOLE SODIUM 40 MG/1
TAKE ONE TABLET BY MOUTH 30 MINUTES BEFORE BREAKFAST AND DINNER TABLET, DELAYED RELEASE ORAL
Qty: 60 TABLET | Refills: 12 | OUTPATIENT

## 2022-02-24 RX ORDER — PANTOPRAZOLE SODIUM 40 MG/1
TAKE ONE TABLET BY MOUTH TWICE A DAY 30 MINUTES BEFORE BREAKFAST AND DINNER TABLET, DELAYED RELEASE ORAL
OUTPATIENT

## 2022-03-10 ENCOUNTER — TELEPHONE ENCOUNTER (OUTPATIENT)
Dept: URBAN - METROPOLITAN AREA CLINIC 113 | Facility: CLINIC | Age: 62
End: 2022-03-10

## 2022-03-10 RX ORDER — ADALIMUMAB 40MG/0.4ML
INJECT 1 PEN UNDER THE SKIN EVERY 7 DAYS KIT SUBCUTANEOUS
Status: ACTIVE | COMMUNITY

## 2022-03-10 RX ORDER — PREDNISONE 10 MG/1
4 TABLET TABLET ORAL ONCE A DAY
Qty: 120 TABLET | Refills: 1 | OUTPATIENT
Start: 2022-03-10 | End: 2022-05-09

## 2022-03-10 RX ORDER — PANTOPRAZOLE SODIUM 40 MG/1
TAKE ONE TABLET BY MOUTH 30 MINUTES BEFORE BREAKFAST AND DINNER TABLET, DELAYED RELEASE ORAL
Qty: 60 TABLET | Refills: 12 | Status: ACTIVE | COMMUNITY

## 2022-03-10 RX ORDER — ERGOCALCIFEROL 1.25 MG/1
1 CAPSULE CAPSULE, LIQUID FILLED ORAL
Qty: 8 | Refills: 0 | Status: ACTIVE | COMMUNITY
Start: 2022-01-31 | End: 2022-04-01

## 2022-03-10 RX ORDER — AMLODIPINE BESYLATE 5 MG/1
TAKE 1 TABLET DAILY TABLET ORAL
Qty: 1 | Refills: 3 | Status: ACTIVE | COMMUNITY

## 2022-03-10 RX ORDER — CYANOCOBALAMIN 1000 UG/ML
1 ML INJECTION INTRAMUSCULAR; SUBCUTANEOUS
Qty: 12 | Refills: 1 | Status: ACTIVE | COMMUNITY
Start: 2022-01-31 | End: 2022-04-01

## 2022-03-29 ENCOUNTER — ERX REFILL RESPONSE (OUTPATIENT)
Dept: URBAN - METROPOLITAN AREA CLINIC 113 | Facility: CLINIC | Age: 62
End: 2022-03-29

## 2022-03-29 RX ORDER — ERGOCALCIFEROL 1.25 MG/1
1 CAPSULE CAPSULE, LIQUID FILLED ORAL
Qty: 8 | Refills: 0 | OUTPATIENT

## 2022-03-29 RX ORDER — ERGOCALCIFEROL 1.25 MG/1
TAKE ONE CAPSULE BY MOUTH EVERY WEEK CAPSULE, LIQUID FILLED ORAL
Qty: 8 CAPSULE | Refills: 1 | OUTPATIENT

## 2022-04-06 ENCOUNTER — TELEPHONE ENCOUNTER (OUTPATIENT)
Dept: URBAN - METROPOLITAN AREA CLINIC 113 | Facility: CLINIC | Age: 62
End: 2022-04-06

## 2022-04-06 PROBLEM — 2901004: Status: ACTIVE | Noted: 2022-03-10

## 2022-04-08 LAB
A/G RATIO: 1.3
ALBUMIN: 4
ALKALINE PHOSPHATASE: 91
ALT (SGPT): 30
AST (SGOT): 30
BASO (ABSOLUTE): 0.1
BASOS: 2
BILIRUBIN, TOTAL: 0.5
BUN/CREATININE RATIO: 13
BUN: 12
CALCIUM: 9.6
CARBON DIOXIDE, TOTAL: 21
CHLORIDE: 99
CREATININE: 0.94
EGFR: 70
EOS (ABSOLUTE): 0.5
EOS: 8
FERRITIN, SERUM: 15
GLOBULIN, TOTAL: 3
GLUCOSE: 95
HEMATOCRIT: 38.4
HEMATOLOGY COMMENTS:: (no result)
HEMOGLOBIN: 13
IMMATURE CELLS: (no result)
IMMATURE GRANS (ABS): 0
IMMATURE GRANULOCYTES: 0
IRON BIND.CAP.(TIBC): 402
IRON SATURATION: 9
IRON: 38
LYMPHS (ABSOLUTE): 2.9
LYMPHS: 41
MCH: 32.9
MCHC: 33.9
MCV: 97
MONOCYTES(ABSOLUTE): 0.6
MONOCYTES: 8
NEUTROPHILS (ABSOLUTE): 2.8
NEUTROPHILS: 41
NRBC: (no result)
PLATELETS: 366
POTASSIUM: 4.5
PROTEIN, TOTAL: 7
RBC: 3.95
RDW: 12
SODIUM: 135
UIBC: 364
VITAMIN B12: 378
VITAMIN D, 25-HYDROXY: 37.9
WBC: 6.9

## 2022-04-10 ENCOUNTER — TELEPHONE ENCOUNTER (OUTPATIENT)
Dept: URBAN - METROPOLITAN AREA CLINIC 113 | Facility: CLINIC | Age: 62
End: 2022-04-10

## 2022-04-11 ENCOUNTER — OFFICE VISIT (OUTPATIENT)
Dept: URBAN - METROPOLITAN AREA CLINIC 113 | Facility: CLINIC | Age: 62
End: 2022-04-11

## 2022-04-26 ENCOUNTER — ERX REFILL RESPONSE (OUTPATIENT)
Dept: URBAN - METROPOLITAN AREA CLINIC 107 | Facility: CLINIC | Age: 62
End: 2022-04-26

## 2022-04-26 RX ORDER — ADALIMUMAB 40MG/0.4ML
INJECT 1 PEN UNDER THE SKIN EVERY 7 DAYS KIT SUBCUTANEOUS
OUTPATIENT

## 2022-04-26 RX ORDER — ADALIMUMAB 40MG/0.4ML
INJECT 1 PEN UNDER THE SKIN EVERY 7 DAYS KIT SUBCUTANEOUS
Qty: 4 | Refills: 5 | OUTPATIENT

## 2022-05-27 ENCOUNTER — ERX REFILL RESPONSE (OUTPATIENT)
Dept: URBAN - METROPOLITAN AREA CLINIC 113 | Facility: CLINIC | Age: 62
End: 2022-05-27

## 2022-05-27 RX ORDER — ERGOCALCIFEROL 1.25 MG/1
TAKE ONE CAPSULE BY MOUTH EVERY WEEK CAPSULE, LIQUID FILLED ORAL
Qty: 8 CAPSULE | Refills: 1 | OUTPATIENT

## 2022-06-23 ENCOUNTER — LAB OUTSIDE AN ENCOUNTER (OUTPATIENT)
Dept: URBAN - METROPOLITAN AREA CLINIC 113 | Facility: CLINIC | Age: 62
End: 2022-06-23

## 2022-06-23 ENCOUNTER — OFFICE VISIT (OUTPATIENT)
Dept: URBAN - METROPOLITAN AREA CLINIC 113 | Facility: CLINIC | Age: 62
End: 2022-06-23
Payer: COMMERCIAL

## 2022-06-23 VITALS
HEART RATE: 98 BPM | DIASTOLIC BLOOD PRESSURE: 75 MMHG | RESPIRATION RATE: 18 BRPM | HEIGHT: 65 IN | SYSTOLIC BLOOD PRESSURE: 100 MMHG | BODY MASS INDEX: 27.16 KG/M2 | TEMPERATURE: 97.3 F | WEIGHT: 163 LBS

## 2022-06-23 DIAGNOSIS — K21.9 GASTROESOPHAGEAL REFLUX DISEASE WITHOUT ESOPHAGITIS: ICD-10-CM

## 2022-06-23 DIAGNOSIS — R13.19 ESOPHAGEAL DYSPHAGIA: ICD-10-CM

## 2022-06-23 DIAGNOSIS — E55.9 VITAMIN D DEFICIENCY: ICD-10-CM

## 2022-06-23 DIAGNOSIS — E53.8 B12 DEFICIENCY: ICD-10-CM

## 2022-06-23 DIAGNOSIS — D50.0 IRON DEFICIENCY ANEMIA DUE TO CHRONIC BLOOD LOSS: ICD-10-CM

## 2022-06-23 DIAGNOSIS — Z79.899 HIGH RISK MEDICATIONS (NOT ANTICOAGULANTS) LONG-TERM USE: ICD-10-CM

## 2022-06-23 DIAGNOSIS — K50.813 CROHN'S DISEASE OF BOTH SMALL AND LARGE INTESTINE WITH FISTULA: ICD-10-CM

## 2022-06-23 PROCEDURE — 99214 OFFICE O/P EST MOD 30 MIN: CPT | Performed by: INTERNAL MEDICINE

## 2022-06-23 RX ORDER — PANTOPRAZOLE SODIUM 40 MG/1
TAKE ONE TABLET BY MOUTH 30 MINUTES BEFORE BREAKFAST AND DINNER TABLET, DELAYED RELEASE ORAL
Qty: 60 TABLET | Refills: 12 | Status: ACTIVE | COMMUNITY

## 2022-06-23 RX ORDER — AMLODIPINE BESYLATE 5 MG/1
TAKE 1 TABLET DAILY TABLET ORAL
Qty: 1 | Refills: 3 | Status: ON HOLD | COMMUNITY

## 2022-06-23 RX ORDER — ASCORBIC ACID 125 MG
AS DIRECTED TABLET,CHEWABLE ORAL
Status: ACTIVE | COMMUNITY

## 2022-06-23 RX ORDER — ADALIMUMAB 40MG/0.4ML
INJECT 1 PEN UNDER THE SKIN EVERY 7 DAYS KIT SUBCUTANEOUS
Qty: 4 | Refills: 5 | Status: ACTIVE | COMMUNITY

## 2022-06-23 RX ORDER — AMLODIPINE BESYLATE AND BENAZEPRIL HYDROCHLORIDE 5; 10 MG/1; MG/1
AS DIRECTED CAPSULE ORAL
Status: ACTIVE | COMMUNITY

## 2022-06-23 RX ORDER — ADALIMUMAB 40MG/0.4ML
INJECT 1 PEN UNDER THE SKIN EVERY 7 DAYS KIT SUBCUTANEOUS
OUTPATIENT

## 2022-06-23 RX ORDER — ERGOCALCIFEROL 1.25 MG/1
TAKE ONE CAPSULE BY MOUTH EVERY WEEK CAPSULE, LIQUID FILLED ORAL
Qty: 8 CAPSULE | Refills: 1 | Status: ACTIVE | COMMUNITY

## 2022-06-23 RX ORDER — SILDENAFIL 25 MG/1
1 TABLET AS NEEDED TABLET, FILM COATED ORAL ONCE A DAY
Status: ACTIVE | COMMUNITY

## 2022-06-23 RX ORDER — PANTOPRAZOLE SODIUM 40 MG/1
TAKE ONE TABLET BY MOUTH 30 MINUTES BEFORE BREAKFAST AND DINNER TABLET, DELAYED RELEASE ORAL
OUTPATIENT

## 2022-06-23 RX ORDER — ERGOCALCIFEROL 1.25 MG/1
TAKE ONE CAPSULE BY MOUTH EVERY WEEK CAPSULE, LIQUID FILLED ORAL
OUTPATIENT

## 2022-06-23 NOTE — HPI-TODAY'S VISIT:
Ms. Chaves is a 61-year-old woman with a history of small bowel, colonic and perianal Crohn's disease status post ileocecal resection on Humira 40 mg weekly presenting for follow up regarding anemia and dysphagia, and Crohn's disease.  She reports that she is doing fairly well with her bowel movements typically having 4-5 loose bowel movements with some urgency.  No nocturnal bowel movements.  She has been taking the Humira 40 mg weekly.  She is been having more issues with swallowing including episodes where food will "stick" in her chest and even cause difficulty breathing.  She denies any NSAID use.  Labs on 5/5/2022 show WBC 7.3, hemoglobin 11.8, MCV 97, platelets 411, normal basic metabolic panel, normal liver function tests.

## 2022-07-07 PROBLEM — 266435005: Status: ACTIVE | Noted: 2022-01-31

## 2022-07-07 PROBLEM — 34713006: Status: ACTIVE | Noted: 2021-01-10

## 2022-07-07 PROBLEM — 190634004: Status: ACTIVE | Noted: 2021-01-10

## 2022-07-07 PROBLEM — 724556004: Status: ACTIVE | Noted: 2022-06-23

## 2022-07-07 PROBLEM — 40890009: Status: ACTIVE | Noted: 2022-06-23

## 2022-07-14 ENCOUNTER — ERX REFILL RESPONSE (OUTPATIENT)
Dept: URBAN - METROPOLITAN AREA CLINIC 113 | Facility: CLINIC | Age: 62
End: 2022-07-14

## 2022-07-14 RX ORDER — CYANOCOBALAMIN 1000 UG/ML
INJECT 1ML ONCE A WEEK FOR 4 WEEKS THEN INJECT 1ML ONCE A MONTH INJECTION, SOLUTION INTRAMUSCULAR
Qty: 12 MILLILITER | Refills: 2 | OUTPATIENT

## 2022-07-14 RX ORDER — CYANOCOBALAMIN 1000 UG/ML
INJECT 1ML ONCE A WEEK FOR 4 WEEKS THEN INJECT 1ML ONCE A MONTH INJECTION, SOLUTION INTRAMUSCULAR
Qty: 12 MILLILITER | Refills: 1 | OUTPATIENT

## 2022-07-25 ENCOUNTER — ERX REFILL RESPONSE (OUTPATIENT)
Dept: URBAN - METROPOLITAN AREA CLINIC 113 | Facility: CLINIC | Age: 62
End: 2022-07-25

## 2022-07-25 RX ORDER — ERGOCALCIFEROL 1.25 MG/1
TAKE ONE CAPSULE BY MOUTH EVERY WEEK CAPSULE, LIQUID FILLED ORAL
Qty: 8 | Refills: 1 | OUTPATIENT

## 2022-07-25 RX ORDER — ERGOCALCIFEROL 1.25 MG/1
TAKE ONE CAPSULE BY MOUTH EVERY WEEK CAPSULE, LIQUID FILLED ORAL
OUTPATIENT

## 2022-08-04 ENCOUNTER — TELEPHONE ENCOUNTER (OUTPATIENT)
Dept: URBAN - METROPOLITAN AREA CLINIC 113 | Facility: CLINIC | Age: 62
End: 2022-08-04

## 2022-08-04 ENCOUNTER — OFFICE VISIT (OUTPATIENT)
Dept: URBAN - METROPOLITAN AREA SURGERY CENTER 25 | Facility: SURGERY CENTER | Age: 62
End: 2022-08-04
Payer: COMMERCIAL

## 2022-08-04 DIAGNOSIS — K21.00 REFLUX ESOPHAGITIS: ICD-10-CM

## 2022-08-04 DIAGNOSIS — K29.50 CHRONIC GASTRITIS: ICD-10-CM

## 2022-08-04 DIAGNOSIS — K22.2 SCHATZKI'S RING: ICD-10-CM

## 2022-08-04 PROCEDURE — 43248 EGD GUIDE WIRE INSERTION: CPT | Performed by: INTERNAL MEDICINE

## 2022-08-04 PROCEDURE — 43239 EGD BIOPSY SINGLE/MULTIPLE: CPT | Performed by: INTERNAL MEDICINE

## 2022-08-04 PROCEDURE — G8907 PT DOC NO EVENTS ON DISCHARG: HCPCS | Performed by: INTERNAL MEDICINE

## 2022-08-12 PROBLEM — 66889002 SCHATZKI'S RING: Status: ACTIVE | Noted: 2022-08-12

## 2022-08-12 PROBLEM — 8493009 CHRONIC GASTRITIS: Status: ACTIVE | Noted: 2022-08-12

## 2022-08-19 ENCOUNTER — OFFICE VISIT (OUTPATIENT)
Dept: URBAN - METROPOLITAN AREA CLINIC 113 | Facility: CLINIC | Age: 62
End: 2022-08-19
Payer: COMMERCIAL

## 2022-08-19 VITALS
HEART RATE: 98 BPM | SYSTOLIC BLOOD PRESSURE: 134 MMHG | HEIGHT: 65 IN | DIASTOLIC BLOOD PRESSURE: 95 MMHG | BODY MASS INDEX: 26.99 KG/M2 | TEMPERATURE: 98.2 F | WEIGHT: 162 LBS

## 2022-08-19 DIAGNOSIS — K31.5 DUODENAL STENOSIS: ICD-10-CM

## 2022-08-19 DIAGNOSIS — Z79.899 HIGH RISK MEDICATIONS (NOT ANTICOAGULANTS) LONG-TERM USE: ICD-10-CM

## 2022-08-19 DIAGNOSIS — M25.50 ARTHRALGIA, UNSPECIFIED JOINT: ICD-10-CM

## 2022-08-19 DIAGNOSIS — K50.813 CROHN'S DISEASE OF BOTH SMALL AND LARGE INTESTINE WITH FISTULA: ICD-10-CM

## 2022-08-19 DIAGNOSIS — K21.00 GASTROESOPHAGEAL REFLUX DISEASE WITH ESOPHAGITIS WITHOUT HEMORRHAGE: ICD-10-CM

## 2022-08-19 PROCEDURE — 99214 OFFICE O/P EST MOD 30 MIN: CPT | Performed by: INTERNAL MEDICINE

## 2022-08-19 RX ORDER — AMLODIPINE BESYLATE 5 MG/1
TAKE 1 TABLET DAILY TABLET ORAL
Qty: 1 | Refills: 3 | Status: ON HOLD | COMMUNITY

## 2022-08-19 RX ORDER — SILDENAFIL 25 MG/1
1 TABLET AS NEEDED TABLET, FILM COATED ORAL ONCE A DAY
Status: ACTIVE | COMMUNITY

## 2022-08-19 RX ORDER — PANTOPRAZOLE SODIUM 40 MG/1
TAKE ONE TABLET BY MOUTH 30 MINUTES BEFORE BREAKFAST AND DINNER TABLET, DELAYED RELEASE ORAL
OUTPATIENT

## 2022-08-19 RX ORDER — HYDROCODONE BITARTRATE AND ACETAMINOPHEN 5; 325 MG/1; MG/1
1 TABLET AS NEEDED TABLET ORAL
Qty: 30 | Refills: 0 | OUTPATIENT
Start: 2022-08-19 | End: 2022-09-18

## 2022-08-19 RX ORDER — ASCORBIC ACID 125 MG
AS DIRECTED TABLET,CHEWABLE ORAL
Status: ON HOLD | COMMUNITY

## 2022-08-19 RX ORDER — PANTOPRAZOLE SODIUM 40 MG/1
TAKE ONE TABLET BY MOUTH 30 MINUTES BEFORE BREAKFAST AND DINNER TABLET, DELAYED RELEASE ORAL
Status: ACTIVE | COMMUNITY

## 2022-08-19 RX ORDER — ERGOCALCIFEROL 1.25 MG/1
TAKE ONE CAPSULE BY MOUTH EVERY WEEK CAPSULE, LIQUID FILLED ORAL
Qty: 8 | Refills: 1 | Status: ON HOLD | COMMUNITY

## 2022-08-19 RX ORDER — ADALIMUMAB 40MG/0.4ML
INJECT 1 PEN UNDER THE SKIN EVERY 7 DAYS KIT SUBCUTANEOUS
OUTPATIENT

## 2022-08-19 RX ORDER — ADALIMUMAB 40MG/0.4ML
INJECT 1 PEN UNDER THE SKIN EVERY 7 DAYS KIT SUBCUTANEOUS
Status: ACTIVE | COMMUNITY

## 2022-08-19 RX ORDER — AMLODIPINE BESYLATE AND BENAZEPRIL HYDROCHLORIDE 5; 10 MG/1; MG/1
AS DIRECTED CAPSULE ORAL
Status: ACTIVE | COMMUNITY

## 2022-08-19 RX ORDER — CYANOCOBALAMIN 1000 UG/ML
INJECT 1ML ONCE A WEEK FOR 4 WEEKS THEN INJECT 1ML ONCE A MONTH INJECTION, SOLUTION INTRAMUSCULAR
Qty: 12 MILLILITER | Refills: 1 | Status: ACTIVE | COMMUNITY

## 2022-08-19 NOTE — HPI-TODAY'S VISIT:
Ms. Chaves is a 61-year-old woman with a history of small bowel, colonic and perianal Crohn's disease status post ileocecal resection on Humira 40 mg weekly presenting for follow up after undergoing EGD for evaluation of melena and epigastric pain.  She was last seen on 6/23/2022 for follow-up regarding her Crohn's disease it was overall stable on Humira 40 mg weekly.  An EGD was scheduled to evaluate her GERD, dysphagia and history of iron deficiency anemia.  The EGD was performed on 8/4/2022.  Findings are notable for moderate Schatzki's ring status post dilation with a 17 mm Savary dilator and distal esophagus biopsy showing mild reflux esophagitis, small hiatal hernia, diffuse nonerosive gastropathy status post biopsy negative for H. pylori, and duodenal bulb deformity with erythema and moderate stenosis status post biopsies that were unremarkable likely secondary to previous peptic ulcer disease.  She continues to take pantoprazole 40 mg daily.  No NSAID use.  She denies any further melena.  She ports that her dysphagia to solid foods has largely resolved after the EGD with dilation.  She denies any heartburn, nausea or vomiting.  She continues to have 5-6 bowel movements daily with with no significant urgency.  She denies any mucus or red blood per rectum.  She occasionally takes Imodium when leaving the house for any extended period of time.

## 2022-09-06 PROBLEM — 57676002: Status: ACTIVE | Noted: 2022-08-19

## 2022-09-06 PROBLEM — 71833008: Status: ACTIVE | Noted: 2020-10-10

## 2022-09-06 PROBLEM — 266433003: Status: ACTIVE | Noted: 2022-08-19

## 2022-09-06 PROBLEM — 73120006: Status: ACTIVE | Noted: 2022-08-19

## 2022-10-03 ENCOUNTER — LAB OUTSIDE AN ENCOUNTER (OUTPATIENT)
Dept: URBAN - METROPOLITAN AREA CLINIC 113 | Facility: CLINIC | Age: 62
End: 2022-10-03

## 2022-10-03 ENCOUNTER — OFFICE VISIT (OUTPATIENT)
Dept: URBAN - METROPOLITAN AREA CLINIC 113 | Facility: CLINIC | Age: 62
End: 2022-10-03

## 2022-10-10 ENCOUNTER — ERX REFILL RESPONSE (OUTPATIENT)
Dept: URBAN - METROPOLITAN AREA CLINIC 107 | Facility: CLINIC | Age: 62
End: 2022-10-10

## 2022-10-10 RX ORDER — ADALIMUMAB 40MG/0.4ML
INJECT 1 PEN UNDER THE SKIN EVERY 7 DAYS KIT SUBCUTANEOUS
OUTPATIENT

## 2022-10-10 RX ORDER — ADALIMUMAB 40MG/0.4ML
AS DIRECTED KIT SUBCUTANEOUS
Qty: 4 | Refills: 11 | OUTPATIENT

## 2022-11-22 ENCOUNTER — ERX REFILL RESPONSE (OUTPATIENT)
Dept: URBAN - METROPOLITAN AREA CLINIC 113 | Facility: CLINIC | Age: 62
End: 2022-11-22

## 2022-11-22 RX ORDER — ERGOCALCIFEROL 1.25 MG/1
TAKE ONE CAPSULE BY MOUTH EVERY WEEK CAPSULE, LIQUID FILLED ORAL
Qty: 8 | Refills: 1 | OUTPATIENT

## 2022-11-22 RX ORDER — ERGOCALCIFEROL 1.25 MG/1
TAKE ONE CAPSULE BY MOUTH EVERY WEEK CAPSULE, LIQUID FILLED ORAL
Qty: 8 CAPSULE | Refills: 1 | OUTPATIENT

## 2023-02-16 ENCOUNTER — OFFICE VISIT (OUTPATIENT)
Dept: URBAN - METROPOLITAN AREA CLINIC 113 | Facility: CLINIC | Age: 63
End: 2023-02-16

## 2023-02-21 ENCOUNTER — ERX REFILL RESPONSE (OUTPATIENT)
Dept: URBAN - METROPOLITAN AREA CLINIC 113 | Facility: CLINIC | Age: 63
End: 2023-02-21

## 2023-02-21 RX ORDER — PANTOPRAZOLE SODIUM 40 MG/1
TAKE ONE TABLET BY MOUTH 30 MINUTES BEFORE BREAKFAST AND DINNER TABLET, DELAYED RELEASE ORAL
OUTPATIENT

## 2023-02-21 RX ORDER — PANTOPRAZOLE SODIUM 40 MG/1
TAKE ONE TABLET BY MOUTH 30 MINUTES BEFORE BREAKFAST AND DINNER TABLET, DELAYED RELEASE ORAL TWICE A DAY
Qty: 60 | Refills: 11 | OUTPATIENT

## 2023-04-25 ENCOUNTER — TELEPHONE ENCOUNTER (OUTPATIENT)
Dept: URBAN - METROPOLITAN AREA CLINIC 113 | Facility: CLINIC | Age: 63
End: 2023-04-25

## 2023-06-19 ENCOUNTER — OFFICE VISIT (OUTPATIENT)
Dept: URBAN - METROPOLITAN AREA CLINIC 113 | Facility: CLINIC | Age: 63
End: 2023-06-19
Payer: COMMERCIAL

## 2023-06-19 VITALS
WEIGHT: 163 LBS | RESPIRATION RATE: 16 BRPM | HEART RATE: 82 BPM | BODY MASS INDEX: 27.16 KG/M2 | SYSTOLIC BLOOD PRESSURE: 135 MMHG | DIASTOLIC BLOOD PRESSURE: 81 MMHG | TEMPERATURE: 97.3 F | HEIGHT: 65 IN

## 2023-06-19 DIAGNOSIS — R10.84 GENERALIZED ABDOMINAL PAIN: ICD-10-CM

## 2023-06-19 DIAGNOSIS — D50.0 IRON DEFICIENCY ANEMIA DUE TO CHRONIC BLOOD LOSS: ICD-10-CM

## 2023-06-19 DIAGNOSIS — E53.8 B12 DEFICIENCY: ICD-10-CM

## 2023-06-19 DIAGNOSIS — E55.9 VITAMIN D DEFICIENCY: ICD-10-CM

## 2023-06-19 DIAGNOSIS — K50.813 CROHN'S DISEASE OF BOTH SMALL AND LARGE INTESTINE WITH FISTULA: ICD-10-CM

## 2023-06-19 DIAGNOSIS — K21.00 GASTROESOPHAGEAL REFLUX DISEASE WITH ESOPHAGITIS WITHOUT HEMORRHAGE: ICD-10-CM

## 2023-06-19 DIAGNOSIS — Z79.899 HIGH RISK MEDICATIONS (NOT ANTICOAGULANTS) LONG-TERM USE: ICD-10-CM

## 2023-06-19 PROCEDURE — 99214 OFFICE O/P EST MOD 30 MIN: CPT | Performed by: INTERNAL MEDICINE

## 2023-06-19 RX ORDER — ERGOCALCIFEROL 1.25 MG/1
TAKE ONE CAPSULE BY MOUTH EVERY WEEK CAPSULE, LIQUID FILLED ORAL
Qty: 8 CAPSULE | Refills: 1 | Status: ACTIVE | COMMUNITY

## 2023-06-19 RX ORDER — CYANOCOBALAMIN 1000 UG/ML
INJECT 1ML ONCE A WEEK FOR 4 WEEKS THEN INJECT 1ML ONCE A MONTH INJECTION, SOLUTION INTRAMUSCULAR
Qty: 12 MILLILITER | Refills: 1 | Status: ACTIVE | COMMUNITY

## 2023-06-19 RX ORDER — CYANOCOBALAMIN 1000 UG/ML
INJECT 1ML ONCE A WEEK FOR 4 WEEKS THEN INJECT 1ML ONCE A MONTH INJECTION, SOLUTION INTRAMUSCULAR
OUTPATIENT

## 2023-06-19 RX ORDER — ADALIMUMAB 40MG/0.4ML
AS DIRECTED KIT SUBCUTANEOUS
Qty: 4 | Refills: 11 | Status: ACTIVE | COMMUNITY

## 2023-06-19 RX ORDER — AMLODIPINE BESYLATE 5 MG/1
TAKE 1 TABLET DAILY TABLET ORAL
Qty: 1 | Refills: 3 | Status: ON HOLD | COMMUNITY

## 2023-06-19 RX ORDER — ADALIMUMAB 40MG/0.4ML
AS DIRECTED KIT SUBCUTANEOUS
OUTPATIENT

## 2023-06-19 RX ORDER — PANTOPRAZOLE SODIUM 40 MG/1
TAKE ONE TABLET BY MOUTH 30 MINUTES BEFORE BREAKFAST AND DINNER TABLET, DELAYED RELEASE ORAL TWICE A DAY
Qty: 60 | Refills: 11 | Status: ACTIVE | COMMUNITY

## 2023-06-19 RX ORDER — ASCORBIC ACID 125 MG
AS DIRECTED TABLET,CHEWABLE ORAL
Status: ON HOLD | COMMUNITY

## 2023-06-19 RX ORDER — PANTOPRAZOLE SODIUM 40 MG/1
TAKE ONE TABLET BY MOUTH 30 MINUTES BEFORE BREAKFAST AND DINNER TABLET, DELAYED RELEASE ORAL TWICE A DAY
OUTPATIENT

## 2023-06-19 RX ORDER — SILDENAFIL 25 MG/1
1 TABLET AS NEEDED TABLET, FILM COATED ORAL ONCE A DAY
Status: ACTIVE | COMMUNITY

## 2023-06-19 RX ORDER — HYOSCYAMINE SULFATE 0.12 MG/1
1-2 TABLET TABLET SUBLINGUAL
Qty: 60 | Refills: 5 | OUTPATIENT
Start: 2023-06-21 | End: 2023-12-17

## 2023-06-19 RX ORDER — AMLODIPINE BESYLATE AND BENAZEPRIL HYDROCHLORIDE 5; 10 MG/1; MG/1
AS DIRECTED CAPSULE ORAL
Status: ACTIVE | COMMUNITY

## 2023-06-19 NOTE — HPI-OTHER HISTORIES
EGD (8/4/2022): moderate Schatzki's ring status post dilation with a 17 mm Savary dilator and distal esophagus biopsy showing mild reflux esophagitis, small hiatal hernia, diffuse nonerosive gastropathy status post biopsy negative for H. pylori, and duodenal bulb deformity with erythema and moderate stenosis status post biopsies that were unremarkable likely secondary to previous peptic ulcer disease.   Colonoscopy (2/2/2021): notable for perianal skin tags but no evidence of active perianal Crohn's disease, stable anal stricture, and ileocolonic anastomosis with erythema, ulceration and moderate stenosis could not be readily traversed status post biopsy showing chronic active enterocolitis negative for dysplasia, normal neoterminal ileum on limited evaluation, patchy erythema of the right and left colon status post biopsy showing no significant abnormality, sigmoid colon diverticulosis.  Colonoscopy (4/10/18): perianal fistula, perianal skin tags and anal canal stenosis stable from previous studies, patent ileocolonic anastomosis at 45 cm from the anal verge with mild stenosis and ulceration overall improved from her previous examination, normal neoterminal ilium, sigmoid colon diverticulosis. Biopsies of the anastomotic ulcer showed changes consistent with anastomosis without evidence of dysplasia or malignancy, and random colon biopsy showed no significant abnormality.

## 2023-06-19 NOTE — HPI-TODAY'S VISIT:
Ms. Chaves is a 62-year-old woman with a history of small bowel, colonic and perianal Crohn's disease status post ileocecal resection on Humira 40 mg weekly presenting for follow up.  She was last seen on 8/19/2022 for follow-up after undergoing EGD (8/4/22) for evaluation of GERD and Crohn's disease.  The EGD was notable for chronic moderate duodenal stenosis without evidence of active inflammation consistent with prior peptic ulcer disease, mild nonspecific gastritis, and normal esophagus.  Biopsies were negative for granulomas or other features to suggest Crohn's disease.  She was recommended to continue pantoprazole 40 mg daily for GERD, Humira 40 mg weekly for Crohn's disease, and she was given a limited supply of hydrocodone for relief of exacerbations of joint pain (in an effort to avoid all NSAIDs).  Labs were scheduled.    She returns for follow-up reporting that she is doing "okay".  She has intermittent episodes of generalized abdominal pain that seems to be worse in the right upper quadrant.  The abdominal pain is cramping in character and waxes and wanes in severity.  She has some nausea but no vomiting.  She continues to take pantoprazole 40 mg daily.  No NSAID use.  No dysphagia.  She continues to have multiple bowel movements daily that improved using Imodium as needed.  She denies any red blood per rectum. Labs on 6/8/2023 showed WBC 8.6, hemoglobin 11.1, MCV 87, platelets 402, iron 7%, TIBC 482, ferritin 11, normal LFTs, normal basic metabolic panel, vitamin D 36.7, negative TB-quantiferon Gold, Vit B12, CRP 2.

## 2023-06-20 ENCOUNTER — TELEPHONE ENCOUNTER (OUTPATIENT)
Dept: URBAN - METROPOLITAN AREA CLINIC 113 | Facility: CLINIC | Age: 63
End: 2023-06-20

## 2023-06-21 PROBLEM — 102614006: Status: ACTIVE | Noted: 2023-06-21

## 2023-06-22 ENCOUNTER — TELEPHONE ENCOUNTER (OUTPATIENT)
Dept: URBAN - METROPOLITAN AREA CLINIC 113 | Facility: CLINIC | Age: 63
End: 2023-06-22

## 2023-06-22 ENCOUNTER — WEB ENCOUNTER (OUTPATIENT)
Dept: URBAN - METROPOLITAN AREA CLINIC 113 | Facility: CLINIC | Age: 63
End: 2023-06-22

## 2023-06-22 RX ORDER — HYOSCYAMINE SULFATE 0.12 MG/1
1-2 TABLET TABLET SUBLINGUAL
Qty: 90 | Refills: 5
Start: 2023-06-21 | End: 2023-12-19

## 2023-06-26 ENCOUNTER — TELEPHONE ENCOUNTER (OUTPATIENT)
Dept: URBAN - METROPOLITAN AREA CLINIC 113 | Facility: CLINIC | Age: 63
End: 2023-06-26

## 2023-07-10 ENCOUNTER — LAB OUTSIDE AN ENCOUNTER (OUTPATIENT)
Dept: URBAN - METROPOLITAN AREA CLINIC 113 | Facility: CLINIC | Age: 63
End: 2023-07-10

## 2023-08-03 ENCOUNTER — OUT OF OFFICE VISIT (OUTPATIENT)
Dept: URBAN - METROPOLITAN AREA SURGERY CENTER 25 | Facility: SURGERY CENTER | Age: 63
End: 2023-08-03
Payer: COMMERCIAL

## 2023-08-03 ENCOUNTER — CLAIMS CREATED FROM THE CLAIM WINDOW (OUTPATIENT)
Dept: URBAN - METROPOLITAN AREA CLINIC 4 | Facility: CLINIC | Age: 63
End: 2023-08-03
Payer: COMMERCIAL

## 2023-08-03 DIAGNOSIS — K50.10 CROHN'S DISEASE OF LARGE INTESTINE: ICD-10-CM

## 2023-08-03 DIAGNOSIS — K63.89 APPENDICITIS EPIPLOICA: ICD-10-CM

## 2023-08-03 DIAGNOSIS — K91.89 OTHER POSTPROCEDURAL COMPLICATIONS AND DISORDERS OF DIGESTIVE SYSTEM: ICD-10-CM

## 2023-08-03 DIAGNOSIS — K50.10 CC (CROHN'S COLITIS): ICD-10-CM

## 2023-08-03 DIAGNOSIS — K60.3 ANAL FISTULA: ICD-10-CM

## 2023-08-03 DIAGNOSIS — Z98.0 INTESTINAL BYPASS AND ANASTOMOSIS STATUS: ICD-10-CM

## 2023-08-03 DIAGNOSIS — K91.89 AFFERENT LOOP SYNDROME: ICD-10-CM

## 2023-08-03 PROBLEM — 71833008: Status: ACTIVE | Noted: 2023-08-03

## 2023-08-03 PROCEDURE — 88341 IMHCHEM/IMCYTCHM EA ADD ANTB: CPT | Performed by: PATHOLOGY

## 2023-08-03 PROCEDURE — G8907 PT DOC NO EVENTS ON DISCHARG: HCPCS | Performed by: INTERNAL MEDICINE

## 2023-08-03 PROCEDURE — 88342 IMHCHEM/IMCYTCHM 1ST ANTB: CPT | Performed by: PATHOLOGY

## 2023-08-03 PROCEDURE — 00811 ANES LWR INTST NDSC NOS: CPT | Performed by: ANESTHESIOLOGY

## 2023-08-03 PROCEDURE — 00811 ANES LWR INTST NDSC NOS: CPT | Performed by: ANESTHESIOLOGIST ASSISTANT

## 2023-08-03 PROCEDURE — 45380 COLONOSCOPY AND BIOPSY: CPT | Performed by: INTERNAL MEDICINE

## 2023-08-03 PROCEDURE — 88305 TISSUE EXAM BY PATHOLOGIST: CPT | Performed by: PATHOLOGY

## 2023-08-03 RX ORDER — CIPROFLOXACIN 500 MG/1
1 TABLET TABLET, FILM COATED ORAL
Qty: 28 TABLET | Refills: 1 | OUTPATIENT
Start: 2023-08-03 | End: 2023-08-31

## 2023-08-03 RX ORDER — SILDENAFIL 25 MG/1
1 TABLET AS NEEDED TABLET, FILM COATED ORAL ONCE A DAY
Status: ACTIVE | COMMUNITY

## 2023-08-03 RX ORDER — CYANOCOBALAMIN 1000 UG/ML
INJECT 1ML ONCE A WEEK FOR 4 WEEKS THEN INJECT 1ML ONCE A MONTH INJECTION, SOLUTION INTRAMUSCULAR
Status: ACTIVE | COMMUNITY

## 2023-08-03 RX ORDER — AMLODIPINE BESYLATE 5 MG/1
TAKE 1 TABLET DAILY TABLET ORAL
Qty: 1 | Refills: 3 | Status: ON HOLD | COMMUNITY

## 2023-08-03 RX ORDER — PANTOPRAZOLE SODIUM 40 MG/1
TAKE ONE TABLET BY MOUTH 30 MINUTES BEFORE BREAKFAST AND DINNER TABLET, DELAYED RELEASE ORAL TWICE A DAY
Status: ACTIVE | COMMUNITY

## 2023-08-03 RX ORDER — PREDNISONE 10 MG/1
4 TABLET TABLET ORAL ONCE A DAY
Qty: 120 TABLET | Refills: 1 | OUTPATIENT
Start: 2023-08-03 | End: 2023-10-02

## 2023-08-03 RX ORDER — AMLODIPINE BESYLATE AND BENAZEPRIL HYDROCHLORIDE 5; 10 MG/1; MG/1
AS DIRECTED CAPSULE ORAL
Status: ACTIVE | COMMUNITY

## 2023-08-03 RX ORDER — HYOSCYAMINE SULFATE 0.12 MG/1
1-2 TABLET TABLET SUBLINGUAL
Qty: 90 | Refills: 5 | Status: ACTIVE | COMMUNITY
Start: 2023-06-21 | End: 2023-12-19

## 2023-08-03 RX ORDER — ASCORBIC ACID 125 MG
AS DIRECTED TABLET,CHEWABLE ORAL
Status: ON HOLD | COMMUNITY

## 2023-08-03 RX ORDER — ERGOCALCIFEROL 1.25 MG/1
TAKE ONE CAPSULE BY MOUTH EVERY WEEK CAPSULE, LIQUID FILLED ORAL
Qty: 8 CAPSULE | Refills: 1 | Status: ACTIVE | COMMUNITY

## 2023-08-03 RX ORDER — ADALIMUMAB 40MG/0.4ML
AS DIRECTED KIT SUBCUTANEOUS
Status: ACTIVE | COMMUNITY

## 2023-08-03 RX ORDER — METRONIDAZOLE 500 MG/1
1 TABLET TABLET ORAL THREE TIMES A DAY
Qty: 42 TABLET | Refills: 1 | OUTPATIENT
Start: 2023-08-03 | End: 2023-08-31

## 2023-08-04 ENCOUNTER — TELEPHONE ENCOUNTER (OUTPATIENT)
Dept: URBAN - METROPOLITAN AREA CLINIC 113 | Facility: CLINIC | Age: 63
End: 2023-08-04

## 2023-09-13 ENCOUNTER — TELEPHONE ENCOUNTER (OUTPATIENT)
Dept: URBAN - METROPOLITAN AREA CLINIC 113 | Facility: CLINIC | Age: 63
End: 2023-09-13

## 2023-09-13 PROBLEM — 235796008: Status: ACTIVE | Noted: 2023-09-13

## 2023-09-13 RX ORDER — PREDNISONE 10 MG/1
4 TABLET TABLET ORAL ONCE A DAY
Qty: 120 TABLET | Refills: 1 | Status: ACTIVE | COMMUNITY
Start: 2023-08-03 | End: 2023-10-02

## 2023-09-13 RX ORDER — AMLODIPINE BESYLATE 5 MG/1
TAKE 1 TABLET DAILY TABLET ORAL
Qty: 1 | Refills: 3 | Status: ON HOLD | COMMUNITY

## 2023-09-13 RX ORDER — HYDROCODONE BITARTRATE AND ACETAMINOPHEN 5; 325 MG/1; MG/1
1-2 TABLET AS NEEDED TABLET ORAL
Qty: 30 | Refills: 0 | OUTPATIENT
Start: 2023-09-13 | End: 2023-09-27

## 2023-09-13 RX ORDER — CYANOCOBALAMIN 1000 UG/ML
INJECT 1ML ONCE A WEEK FOR 4 WEEKS THEN INJECT 1ML ONCE A MONTH INJECTION, SOLUTION INTRAMUSCULAR
Status: ACTIVE | COMMUNITY

## 2023-09-13 RX ORDER — HYOSCYAMINE SULFATE 0.12 MG/1
1-2 TABLET TABLET SUBLINGUAL
Qty: 90 | Refills: 5 | Status: ACTIVE | COMMUNITY
Start: 2023-06-21 | End: 2023-12-19

## 2023-09-13 RX ORDER — AMLODIPINE BESYLATE AND BENAZEPRIL HYDROCHLORIDE 5; 10 MG/1; MG/1
AS DIRECTED CAPSULE ORAL
Status: ACTIVE | COMMUNITY

## 2023-09-13 RX ORDER — ASCORBIC ACID 125 MG
AS DIRECTED TABLET,CHEWABLE ORAL
Status: ON HOLD | COMMUNITY

## 2023-09-13 RX ORDER — ADALIMUMAB 40MG/0.4ML
AS DIRECTED KIT SUBCUTANEOUS
Status: ACTIVE | COMMUNITY

## 2023-09-13 RX ORDER — ERGOCALCIFEROL 1.25 MG/1
TAKE ONE CAPSULE BY MOUTH EVERY WEEK CAPSULE, LIQUID FILLED ORAL
Qty: 8 CAPSULE | Refills: 1 | Status: ACTIVE | COMMUNITY

## 2023-09-13 RX ORDER — PANTOPRAZOLE SODIUM 40 MG/1
TAKE ONE TABLET BY MOUTH 30 MINUTES BEFORE BREAKFAST AND DINNER TABLET, DELAYED RELEASE ORAL TWICE A DAY
Status: ACTIVE | COMMUNITY

## 2023-09-13 RX ORDER — SILDENAFIL 25 MG/1
1 TABLET AS NEEDED TABLET, FILM COATED ORAL ONCE A DAY
Status: ACTIVE | COMMUNITY

## 2023-10-04 ENCOUNTER — OFFICE VISIT (OUTPATIENT)
Dept: URBAN - METROPOLITAN AREA CLINIC 113 | Facility: CLINIC | Age: 63
End: 2023-10-04
Payer: COMMERCIAL

## 2023-10-04 VITALS
HEIGHT: 65 IN | DIASTOLIC BLOOD PRESSURE: 76 MMHG | HEART RATE: 98 BPM | BODY MASS INDEX: 26.49 KG/M2 | TEMPERATURE: 97.5 F | WEIGHT: 159 LBS | RESPIRATION RATE: 14 BRPM | SYSTOLIC BLOOD PRESSURE: 122 MMHG

## 2023-10-04 DIAGNOSIS — D50.0 IRON DEFICIENCY ANEMIA DUE TO CHRONIC BLOOD LOSS: ICD-10-CM

## 2023-10-04 DIAGNOSIS — K21.00 GASTROESOPHAGEAL REFLUX DISEASE WITH ESOPHAGITIS WITHOUT HEMORRHAGE: ICD-10-CM

## 2023-10-04 DIAGNOSIS — K50.813 CROHN'S DISEASE OF BOTH SMALL AND LARGE INTESTINE WITH FISTULA: ICD-10-CM

## 2023-10-04 DIAGNOSIS — Z79.899 HIGH RISK MEDICATIONS (NOT ANTICOAGULANTS) LONG-TERM USE: ICD-10-CM

## 2023-10-04 DIAGNOSIS — K50.113 CROHN'S DISEASE OF PERIANAL REGION WITH FISTULA: ICD-10-CM

## 2023-10-04 PROCEDURE — 99214 OFFICE O/P EST MOD 30 MIN: CPT | Performed by: INTERNAL MEDICINE

## 2023-10-04 RX ORDER — AMLODIPINE BESYLATE AND BENAZEPRIL HYDROCHLORIDE 5; 10 MG/1; MG/1
AS DIRECTED CAPSULE ORAL
Status: ACTIVE | COMMUNITY

## 2023-10-04 RX ORDER — AMLODIPINE BESYLATE 5 MG/1
TAKE 1 TABLET DAILY TABLET ORAL
Qty: 1 | Refills: 3 | Status: ON HOLD | COMMUNITY

## 2023-10-04 RX ORDER — ADALIMUMAB 40MG/0.4ML
AS DIRECTED KIT SUBCUTANEOUS
OUTPATIENT

## 2023-10-04 RX ORDER — ERGOCALCIFEROL 1.25 MG/1
TAKE ONE CAPSULE BY MOUTH EVERY WEEK CAPSULE, LIQUID FILLED ORAL
Qty: 8 CAPSULE | Refills: 1 | Status: ACTIVE | COMMUNITY

## 2023-10-04 RX ORDER — ADALIMUMAB 40MG/0.4ML
AS DIRECTED KIT SUBCUTANEOUS
Status: ACTIVE | COMMUNITY

## 2023-10-04 RX ORDER — HYOSCYAMINE SULFATE 0.12 MG/1
1-2 TABLET TABLET SUBLINGUAL
Qty: 90 | Refills: 5 | Status: ACTIVE | COMMUNITY
Start: 2023-06-21 | End: 2023-12-19

## 2023-10-04 RX ORDER — CYANOCOBALAMIN 1000 UG/ML
INJECT 1ML ONCE A WEEK FOR 4 WEEKS THEN INJECT 1ML ONCE A MONTH INJECTION, SOLUTION INTRAMUSCULAR
Status: ACTIVE | COMMUNITY

## 2023-10-04 RX ORDER — PANTOPRAZOLE SODIUM 40 MG/1
TAKE ONE TABLET BY MOUTH 30 MINUTES BEFORE BREAKFAST AND DINNER TABLET, DELAYED RELEASE ORAL TWICE A DAY
Status: ACTIVE | COMMUNITY

## 2023-10-04 RX ORDER — SILDENAFIL 25 MG/1
1 TABLET AS NEEDED TABLET, FILM COATED ORAL ONCE A DAY
Status: ACTIVE | COMMUNITY

## 2023-10-04 RX ORDER — ASCORBIC ACID 125 MG
AS DIRECTED TABLET,CHEWABLE ORAL
Status: ON HOLD | COMMUNITY

## 2023-10-04 RX ORDER — PANTOPRAZOLE SODIUM 40 MG/1
TAKE ONE TABLET BY MOUTH 30 MINUTES BEFORE BREAKFAST AND DINNER TABLET, DELAYED RELEASE ORAL TWICE A DAY
OUTPATIENT

## 2023-10-23 ENCOUNTER — TELEPHONE ENCOUNTER (OUTPATIENT)
Dept: URBAN - METROPOLITAN AREA CLINIC 23 | Facility: CLINIC | Age: 63
End: 2023-10-23

## 2023-10-26 LAB
MITOGEN-NIL: >10
QUANTIFERON NIL VALUE: 0.17
QUANTIFERON TB1 AG VALUE: 0.02
QUANTIFERON TB2 AG VALUE: 0.02
QUANTIFERON-TB GOLD PLUS: NEGATIVE

## 2023-11-07 ENCOUNTER — TELEPHONE ENCOUNTER (OUTPATIENT)
Dept: URBAN - METROPOLITAN AREA CLINIC 113 | Facility: CLINIC | Age: 63
End: 2023-11-07

## 2023-11-13 ENCOUNTER — OFFICE VISIT (OUTPATIENT)
Dept: URBAN - METROPOLITAN AREA CLINIC 112 | Facility: CLINIC | Age: 63
End: 2023-11-13
Payer: COMMERCIAL

## 2023-11-13 ENCOUNTER — TELEPHONE ENCOUNTER (OUTPATIENT)
Dept: URBAN - METROPOLITAN AREA CLINIC 113 | Facility: CLINIC | Age: 63
End: 2023-11-13

## 2023-11-13 VITALS
WEIGHT: 161 LBS | HEIGHT: 65 IN | BODY MASS INDEX: 26.82 KG/M2 | TEMPERATURE: 98.4 F | SYSTOLIC BLOOD PRESSURE: 134 MMHG | DIASTOLIC BLOOD PRESSURE: 86 MMHG | RESPIRATION RATE: 18 BRPM | HEART RATE: 92 BPM

## 2023-11-13 DIAGNOSIS — K50.813 CROHN'S DISEASE OF BOTH SMALL AND LARGE INTESTINE W FISTULA: ICD-10-CM

## 2023-11-13 PROCEDURE — 96413 CHEMO IV INFUSION 1 HR: CPT | Performed by: INTERNAL MEDICINE

## 2023-11-13 RX ORDER — PANTOPRAZOLE SODIUM 40 MG/1
TAKE ONE TABLET BY MOUTH 30 MINUTES BEFORE BREAKFAST AND DINNER TABLET, DELAYED RELEASE ORAL TWICE A DAY
Status: ACTIVE | COMMUNITY

## 2023-11-13 RX ORDER — CYANOCOBALAMIN 1000 UG/ML
INJECT 1ML ONCE A WEEK FOR 4 WEEKS THEN INJECT 1ML ONCE A MONTH INJECTION, SOLUTION INTRAMUSCULAR
Status: ACTIVE | COMMUNITY

## 2023-11-13 RX ORDER — ASCORBIC ACID 125 MG
AS DIRECTED TABLET,CHEWABLE ORAL
Status: ON HOLD | COMMUNITY

## 2023-11-13 RX ORDER — AMLODIPINE BESYLATE AND BENAZEPRIL HYDROCHLORIDE 5; 10 MG/1; MG/1
AS DIRECTED CAPSULE ORAL
Status: ACTIVE | COMMUNITY

## 2023-11-13 RX ORDER — HYOSCYAMINE SULFATE 0.12 MG/1
1-2 TABLET TABLET SUBLINGUAL
Qty: 90 | Refills: 5 | Status: ACTIVE | COMMUNITY
Start: 2023-06-21 | End: 2023-12-19

## 2023-11-13 RX ORDER — SILDENAFIL 25 MG/1
1 TABLET AS NEEDED TABLET, FILM COATED ORAL ONCE A DAY
Status: ACTIVE | COMMUNITY

## 2023-11-13 RX ORDER — ERGOCALCIFEROL 1.25 MG/1
TAKE ONE CAPSULE BY MOUTH EVERY WEEK CAPSULE, LIQUID FILLED ORAL
Qty: 8 CAPSULE | Refills: 1 | Status: ACTIVE | COMMUNITY

## 2023-11-13 RX ORDER — AMLODIPINE BESYLATE 5 MG/1
TAKE 1 TABLET DAILY TABLET ORAL
Qty: 1 | Refills: 3 | Status: ON HOLD | COMMUNITY

## 2023-12-04 ENCOUNTER — TELEPHONE ENCOUNTER (OUTPATIENT)
Dept: URBAN - METROPOLITAN AREA CLINIC 113 | Facility: CLINIC | Age: 63
End: 2023-12-04

## 2023-12-05 ENCOUNTER — ERX REFILL RESPONSE (OUTPATIENT)
Dept: URBAN - METROPOLITAN AREA CLINIC 113 | Facility: CLINIC | Age: 63
End: 2023-12-05

## 2023-12-05 RX ORDER — HYOSCYAMINE SULFATE 0.12 MG/1
1-2 TABLET TABLET SUBLINGUAL
Qty: 90 | Refills: 5 | OUTPATIENT

## 2023-12-20 ENCOUNTER — OFFICE VISIT (OUTPATIENT)
Dept: URBAN - METROPOLITAN AREA CLINIC 113 | Facility: CLINIC | Age: 63
End: 2023-12-20
Payer: COMMERCIAL

## 2023-12-20 VITALS
HEART RATE: 89 BPM | BODY MASS INDEX: 26.99 KG/M2 | DIASTOLIC BLOOD PRESSURE: 80 MMHG | SYSTOLIC BLOOD PRESSURE: 119 MMHG | HEIGHT: 65 IN | RESPIRATION RATE: 18 BRPM | TEMPERATURE: 97.3 F | WEIGHT: 162 LBS

## 2023-12-20 DIAGNOSIS — K50.813 CROHN'S DISEASE OF BOTH SMALL AND LARGE INTESTINE WITH FISTULA: ICD-10-CM

## 2023-12-20 DIAGNOSIS — K63.8219 SMALL INTESTINAL BACTERIAL OVERGROWTH (SIBO): ICD-10-CM

## 2023-12-20 DIAGNOSIS — E55.9 VITAMIN D DEFICIENCY: ICD-10-CM

## 2023-12-20 DIAGNOSIS — Z79.899 HIGH RISK MEDICATIONS (NOT ANTICOAGULANTS) LONG-TERM USE: ICD-10-CM

## 2023-12-20 PROBLEM — 446081009: Status: ACTIVE | Noted: 2023-12-20

## 2023-12-20 PROBLEM — 309298003: Status: ACTIVE | Noted: 2020-10-10

## 2023-12-20 PROCEDURE — 99214 OFFICE O/P EST MOD 30 MIN: CPT | Performed by: INTERNAL MEDICINE

## 2023-12-20 RX ORDER — ASCORBIC ACID 125 MG
AS DIRECTED TABLET,CHEWABLE ORAL
Status: ON HOLD | COMMUNITY

## 2023-12-20 RX ORDER — CYANOCOBALAMIN 1000 UG/ML
INJECT 1ML ONCE A WEEK FOR 4 WEEKS THEN INJECT 1ML ONCE A MONTH INJECTION, SOLUTION INTRAMUSCULAR
Status: ON HOLD | COMMUNITY

## 2023-12-20 RX ORDER — PANTOPRAZOLE SODIUM 40 MG/1
TAKE ONE TABLET BY MOUTH 30 MINUTES BEFORE BREAKFAST AND DINNER TABLET, DELAYED RELEASE ORAL TWICE A DAY
Status: ACTIVE | COMMUNITY

## 2023-12-20 RX ORDER — USTEKINUMAB 90 MG/ML
AS DIRECTED INJECTION, SOLUTION SUBCUTANEOUS
Status: ACTIVE | COMMUNITY

## 2023-12-20 RX ORDER — AMLODIPINE BESYLATE 5 MG/1
TAKE 1 TABLET DAILY TABLET ORAL
Qty: 1 | Refills: 3 | Status: ON HOLD | COMMUNITY

## 2023-12-20 RX ORDER — HYOSCYAMINE SULFATE 0.12 MG/1
1-2 TABLET TABLET SUBLINGUAL
Qty: 90 | Refills: 5 | Status: ACTIVE | COMMUNITY

## 2023-12-20 RX ORDER — SILDENAFIL 25 MG/1
1 TABLET AS NEEDED TABLET, FILM COATED ORAL ONCE A DAY
Status: ACTIVE | COMMUNITY

## 2023-12-20 RX ORDER — ERGOCALCIFEROL 1.25 MG/1
1 CAPSULE CAPSULE, LIQUID FILLED ORAL
Qty: 12 | Refills: 1

## 2023-12-20 RX ORDER — RIFAXIMIN 550 MG/1
1 TABLET TABLET ORAL THREE TIMES A DAY
Qty: 42 TABLET | Refills: 1 | OUTPATIENT
Start: 2023-12-20 | End: 2024-01-17

## 2023-12-20 RX ORDER — ERGOCALCIFEROL 1.25 MG/1
TAKE ONE CAPSULE BY MOUTH EVERY WEEK CAPSULE, LIQUID FILLED ORAL
Qty: 8 CAPSULE | Refills: 1 | Status: ACTIVE | COMMUNITY

## 2023-12-20 RX ORDER — AMLODIPINE BESYLATE AND BENAZEPRIL HYDROCHLORIDE 5; 10 MG/1; MG/1
AS DIRECTED CAPSULE ORAL
Status: ACTIVE | COMMUNITY

## 2023-12-20 RX ORDER — USTEKINUMAB 90 MG/ML
AS DIRECTED INJECTION, SOLUTION SUBCUTANEOUS
OUTPATIENT

## 2023-12-20 NOTE — HPI-OTHER HISTORIES
Colonoscopy was performed on 8/3/2023 for the evaluation of Crohn's colitis with recent exacerbation of symptoms including perianal fistula drainage. The findings were notable for perianal fistula and perianal skin tags without signs of infection and anal canal stenosis, diverticulosis of the sigmoid colon, descending colon and transverse colon, moderate stenosis with ulceration at the ileocolonic anastomosis that could not be traversed status post biopsy showing chronic active inflammation consistent with anastomosis related changes negative for dysplasia, mild vascular changes of the right colon and biopsies of the right colon, left colon and rectum showing no significant abnormality. Labs on 6/13/2023 show normal CBC, normal basic metabolic panel, normal liver function tests, iron saturation 7%, TIBC 482, iron 35, ferritin 11; CRP 2; negative QuantiFERON-TB gold plus, vitamin D 25-hydroxy 36.7, vitamin B12 507   EGD (8/4/2022): moderate Schatzki's ring status post dilation with a 17 mm Savary dilator and distal esophagus biopsy showing mild reflux esophagitis, small hiatal hernia, diffuse nonerosive gastropathy status post biopsy negative for H. pylori, and duodenal bulb deformity with erythema and moderate stenosis status post biopsies that were unremarkable likely secondary to previous peptic ulcer disease.   Colonoscopy (2/2/2021): notable for perianal skin tags but no evidence of active perianal Crohn's disease, stable anal stricture, and ileocolonic anastomosis with erythema, ulceration and moderate stenosis could not be readily traversed status post biopsy showing chronic active enterocolitis negative for dysplasia, normal neoterminal ileum on limited evaluation, patchy erythema of the right and left colon status post biopsy showing no significant abnormality, sigmoid colon diverticulosis.  Colonoscopy (4/10/18): perianal fistula, perianal skin tags and anal canal stenosis stable from previous studies, patent ileocolonic anastomosis at 45 cm from the anal verge with mild stenosis and ulceration overall improved from her previous examination, normal neoterminal ilium, sigmoid colon diverticulosis. Biopsies of the anastomotic ulcer showed changes consistent with anastomosis without evidence of dysplasia or malignancy, and random colon biopsy showed no significant abnormality.

## 2023-12-20 NOTE — HPI-TODAY'S VISIT:
Ms. Chaves is a 63-year-old woman with a history of small bowel, colonic and perianal Crohn's disease status post ileocecal resection presenting for follow up after Stelara infusion.  She was last seen in the office on 10/4/2023 for follow-up regarding Crohn's disease of the small bowel, colon and perianal region complicated by fistula with persistent symptoms including recurrent perianal disease despite taking Humira 40 mg weekly.  We decided to discontinue Humira and initiate Stelara.  She was given prescriptions for ciprofloxacin and Flagyl to treat any exacerbations of perianal disease.She reports approximately 24 hours after receiving her Stelara infusion developing unusual shortness of breath and chest pressure.  She was sent to [facility] emergency department where labs, chest x-ray and EKG were unremarkable.  Her symptoms improved and she was discharged home.  She also reports experiencing some near vision issues and headache for about 2 weeks after the initial infusion.  She is due for her next injection on 1/8/2024.    She reports some issues with fecal incontinence using Imodium on an as-needed basis.  She continues to have multiple bowel movements daily with some urgency.  She rarely has any nocturnal symptoms no red blood per rectum.  She is not taking any NSAIDs.  Labs 11/27/2023 show WBC 5.3, hemoglobin 14.6, MCV 98, platelets 300, basic metabolic panel within normal limits, total bilirubin 1.2, compressively is 92, AST 37, ALT 34, albumin 4.2, total protein 7.0, vitamin D 25.3, iron 280, TIBC 372, percent saturation 75.  Repeat labs on 12/5/2023 show iron 87, TIBC 427, percent saturation 20, ferritin 34, LFTs within normal limits, basic metabolic panel within normal limits.

## 2023-12-29 ENCOUNTER — TELEPHONE ENCOUNTER (OUTPATIENT)
Dept: URBAN - METROPOLITAN AREA CLINIC 113 | Facility: CLINIC | Age: 63
End: 2023-12-29

## 2023-12-29 RX ORDER — USTEKINUMAB 90 MG/ML
AS DIRECTED INJECTION, SOLUTION SUBCUTANEOUS
Qty: 1 | Refills: 7

## 2024-01-08 ENCOUNTER — TELEPHONE ENCOUNTER (OUTPATIENT)
Dept: URBAN - METROPOLITAN AREA CLINIC 113 | Facility: CLINIC | Age: 64
End: 2024-01-08

## 2024-01-12 ENCOUNTER — TELEPHONE ENCOUNTER (OUTPATIENT)
Dept: URBAN - METROPOLITAN AREA CLINIC 113 | Facility: CLINIC | Age: 64
End: 2024-01-12

## 2024-01-12 RX ORDER — USTEKINUMAB 90 MG/ML
INJECT 1 SYRINGE INJECTION, SOLUTION SUBCUTANEOUS
Qty: 1 | Refills: 7

## 2024-03-25 ENCOUNTER — OV EP (OUTPATIENT)
Dept: URBAN - METROPOLITAN AREA CLINIC 113 | Facility: CLINIC | Age: 64
End: 2024-03-25

## 2024-03-25 VITALS
DIASTOLIC BLOOD PRESSURE: 82 MMHG | HEIGHT: 65 IN | HEART RATE: 99 BPM | WEIGHT: 163.4 LBS | BODY MASS INDEX: 27.22 KG/M2 | TEMPERATURE: 97.3 F | RESPIRATION RATE: 18 BRPM | SYSTOLIC BLOOD PRESSURE: 119 MMHG

## 2024-03-25 RX ORDER — CYANOCOBALAMIN 1000 UG/ML
INJECT 1ML ONCE A WEEK FOR 4 WEEKS THEN INJECT 1ML ONCE A MONTH INJECTION, SOLUTION INTRAMUSCULAR
Status: ON HOLD | COMMUNITY

## 2024-03-25 RX ORDER — HYOSCYAMINE SULFATE 0.12 MG/1
1-2 TABLET TABLET SUBLINGUAL
Qty: 90 | Refills: 5 | Status: ACTIVE | COMMUNITY

## 2024-03-25 RX ORDER — USTEKINUMAB 90 MG/ML
INJECT 1 SYRINGE INJECTION, SOLUTION SUBCUTANEOUS
Qty: 1 | Refills: 0 | Status: ACTIVE | COMMUNITY
End: 2024-05-12

## 2024-03-25 RX ORDER — ASCORBIC ACID 125 MG
AS DIRECTED TABLET,CHEWABLE ORAL
Status: ON HOLD | COMMUNITY

## 2024-03-25 RX ORDER — SILDENAFIL 25 MG/1
1 TABLET AS NEEDED TABLET, FILM COATED ORAL ONCE A DAY
Status: ON HOLD | COMMUNITY

## 2024-03-25 RX ORDER — AMLODIPINE BESYLATE 5 MG/1
TAKE 1 TABLET DAILY TABLET ORAL
Qty: 1 | Refills: 3 | Status: ON HOLD | COMMUNITY

## 2024-03-25 RX ORDER — ERGOCALCIFEROL 1.25 MG/1
1 CAPSULE CAPSULE, LIQUID FILLED ORAL
Qty: 12 | Refills: 1 | Status: ACTIVE | COMMUNITY

## 2024-03-25 RX ORDER — AMLODIPINE BESYLATE AND BENAZEPRIL HYDROCHLORIDE 5; 10 MG/1; MG/1
AS DIRECTED CAPSULE ORAL
Status: ACTIVE | COMMUNITY

## 2024-03-25 RX ORDER — PANTOPRAZOLE SODIUM 40 MG/1
TAKE ONE TABLET BY MOUTH 30 MINUTES BEFORE BREAKFAST AND DINNER TABLET, DELAYED RELEASE ORAL ONCE A DAY
Status: ACTIVE | COMMUNITY

## 2024-03-25 NOTE — HPI-OTHER HISTORIES
Labs (11/27/2023): WBC 5.3, hemoglobin 14.6, MCV 98, platelets 300, basic metabolic panel within normal limits, total bilirubin 1.2, compressively is 92, AST 37, ALT 34, albumin 4.2, total protein 7.0, vitamin D 25.3, iron 280, TIBC 372, percent saturation 75. Labs (12/5/2023): iron 87, TIBC 427, percent saturation 20, ferritin 34, LFTs within normal limits, basic metabolic panel within normal limits.  Colonoscopy (8/3/2023): Crohn's colitis with recent exacerbation of symptoms including perianal fistula drainage. The findings were notable for perianal fistula and perianal skin tags without signs of infection and anal canal stenosis, diverticulosis of the sigmoid colon, descending colon and transverse colon, moderate stenosis with ulceration at the ileocolonic anastomosis that could not be traversed status post biopsy showing chronic active inflammation consistent with anastomosis related changes negative for dysplasia, mild vascular changes of the right colon and biopsies of the right colon, left colon and rectum showing no significant abnormality. Labs (6/13/2023): normal CBC, normal basic metabolic panel, normal liver function tests, iron saturation 7%, TIBC 482, iron 35, ferritin 11; CRP 2; negative QuantiFERON-TB gold plus, vitamin D 25-hydroxy 36.7, vitamin B12 507   EGD (8/4/2022): moderate Schatzki's ring status post dilation with a 17 mm Savary dilator and distal esophagus biopsy showing mild reflux esophagitis, small hiatal hernia, diffuse nonerosive gastropathy status post biopsy negative for H. pylori, and duodenal bulb deformity with erythema and moderate stenosis status post biopsies that were unremarkable likely secondary to previous peptic ulcer disease.   Colonoscopy (2/2/2021): notable for perianal skin tags but no evidence of active perianal Crohn's disease, stable anal stricture, and ileocolonic anastomosis with erythema, ulceration and moderate stenosis could not be readily traversed status post biopsy showing chronic active enterocolitis negative for dysplasia, normal neoterminal ileum on limited evaluation, patchy erythema of the right and left colon status post biopsy showing no significant abnormality, sigmoid colon diverticulosis.  Colonoscopy (4/10/18): perianal fistula, perianal skin tags and anal canal stenosis stable from previous studies, patent ileocolonic anastomosis at 45 cm from the anal verge with mild stenosis and ulceration overall improved from her previous examination, normal neoterminal ilium, sigmoid colon diverticulosis. Biopsies of the anastomotic ulcer showed changes consistent with anastomosis without evidence of dysplasia or malignancy, and random colon biopsy showed no significant abnormality.

## 2024-03-25 NOTE — HPI-TODAY'S VISIT:
Ms. Chaves is a 63-year-old woman with a history of small bowel, colonic and perianal Crohn's disease status post ileocecal resection presenting for follow up regarding Stelara.

## 2024-04-11 PROBLEM — 190633005: Status: ACTIVE | Noted: 2024-04-11

## 2025-01-09 ENCOUNTER — TELEPHONE ENCOUNTER (OUTPATIENT)
Dept: URBAN - METROPOLITAN AREA CLINIC 113 | Facility: CLINIC | Age: 65
End: 2025-01-09

## 2025-01-09 RX ORDER — USTEKINUMAB 90 MG/ML
INJECT 1 SYRINGE INJECTION, SOLUTION SUBCUTANEOUS
Qty: 1 | Refills: 8

## 2025-02-24 ENCOUNTER — OFFICE VISIT (OUTPATIENT)
Dept: URBAN - METROPOLITAN AREA CLINIC 113 | Facility: CLINIC | Age: 65
End: 2025-02-24

## 2025-02-24 ENCOUNTER — DASHBOARD ENCOUNTERS (OUTPATIENT)
Age: 65
End: 2025-02-24

## 2025-02-24 VITALS
HEIGHT: 65 IN | SYSTOLIC BLOOD PRESSURE: 129 MMHG | DIASTOLIC BLOOD PRESSURE: 87 MMHG | RESPIRATION RATE: 20 BRPM | TEMPERATURE: 97.2 F | HEART RATE: 98 BPM | WEIGHT: 159 LBS | BODY MASS INDEX: 26.49 KG/M2

## 2025-02-24 RX ORDER — USTEKINUMAB 90 MG/ML
INJECT 1 SYRINGE INJECTION, SOLUTION SUBCUTANEOUS
Qty: 1 | Refills: 8 | Status: ACTIVE | COMMUNITY

## 2025-02-24 RX ORDER — AMLODIPINE BESYLATE AND BENAZEPRIL HYDROCHLORIDE 5; 10 MG/1; MG/1
AS DIRECTED CAPSULE ORAL
Status: ACTIVE | COMMUNITY

## 2025-02-24 RX ORDER — ASCORBIC ACID 125 MG
AS DIRECTED TABLET,CHEWABLE ORAL
Status: ON HOLD | COMMUNITY

## 2025-02-24 RX ORDER — SILDENAFIL 25 MG/1
1 TABLET AS NEEDED TABLET, FILM COATED ORAL ONCE A DAY
Status: ON HOLD | COMMUNITY

## 2025-02-24 RX ORDER — CYANOCOBALAMIN 1000 UG/ML
INJECT 1ML ONCE A WEEK FOR 4 WEEKS THEN INJECT 1ML ONCE A MONTH INJECTION, SOLUTION INTRAMUSCULAR
Status: ON HOLD | COMMUNITY

## 2025-02-24 RX ORDER — ERGOCALCIFEROL 1.25 MG/1
TAKE ONE CAPSULE BY MOUTH EVERY WEEK CAPSULE, LIQUID FILLED ORAL
Qty: 12 CAPSULE | Refills: 1 | Status: ACTIVE | COMMUNITY

## 2025-02-24 RX ORDER — HYOSCYAMINE SULFATE 0.12 MG/1
1-2 TABLET TABLET SUBLINGUAL
Qty: 90 | Refills: 5 | Status: ACTIVE | COMMUNITY

## 2025-02-24 RX ORDER — AMLODIPINE BESYLATE 5 MG/1
TAKE 1 TABLET DAILY TABLET ORAL
Qty: 1 | Refills: 3 | Status: ON HOLD | COMMUNITY

## 2025-02-24 RX ORDER — PANTOPRAZOLE SODIUM 40 MG/1
TAKE ONE TABLET BY MOUTH 30 MINUTES BEFORE BREAKFAST AND DINNER TABLET, DELAYED RELEASE ORAL ONCE A DAY
Status: ACTIVE | COMMUNITY

## 2025-02-24 NOTE — HPI-TODAY'S VISIT:
Ms. Chaves is a 64-year-old woman with a history of small bowel, colonic and perianal Crohn's disease status post ileocecal resection presenting for follow up regarding Stelara.

## 2025-07-16 ENCOUNTER — TELEPHONE ENCOUNTER (OUTPATIENT)
Dept: URBAN - METROPOLITAN AREA CLINIC 113 | Facility: CLINIC | Age: 65
End: 2025-07-16

## 2025-08-13 ENCOUNTER — LAB OUTSIDE AN ENCOUNTER (OUTPATIENT)
Dept: URBAN - METROPOLITAN AREA CLINIC 113 | Facility: CLINIC | Age: 65
End: 2025-08-13

## 2025-08-17 LAB
A/G RATIO: 1.4
ABSOLUTE BASOPHILS: 67
ABSOLUTE EOSINOPHILS: 122
ABSOLUTE LYMPHOCYTES: 1647
ABSOLUTE MONOCYTES: 555
ABSOLUTE NEUTROPHILS: 3709
ALBUMIN: 4
ALKALINE PHOSPHATASE: 89
ALT (SGPT): 36
AST (SGOT): 38
BASOPHILS: 1.1
BILIRUBIN, TOTAL: 0.6
BUN/CREATININE RATIO: (no result)
BUN: 12
CALCIUM: 9.2
CARBON DIOXIDE, TOTAL: 22
CHLORIDE: 104
CREATININE: 1.04
EGFR: 61
EOSINOPHILS: 2
FERRITIN, SERUM: 12
FOLATE, SERUM: 6.8
GLOBULIN, TOTAL: 2.9
GLUCOSE: 102
HEMATOCRIT: 41
HEMOGLOBIN: 13.3
IRON BIND.CAP.(TIBC): 465
IRON SATURATION: 31
IRON: 146
LYMPHOCYTES: 27
MCH: 30.9
MCHC: 32.4
MCV: 95.3
MITOGEN-NIL: >10
MONOCYTES: 9.1
MPV: 8.6
NEUTROPHILS: 60.8
PLATELET COUNT: 301
POTASSIUM: 4.4
PROTEIN, TOTAL: 6.9
QUANTIFERON NIL VALUE: 0.15
QUANTIFERON TB1 AG VALUE: 0.02
QUANTIFERON TB2 AG VALUE: 0.01
QUANTIFERON-TB GOLD PLUS: NEGATIVE
RDW: 12.9
RED BLOOD CELL COUNT: 4.3
SODIUM: 136
VITAMIN B12: 329
VITAMIN D,25-OH,TOTAL,IA: 40
WHITE BLOOD CELL COUNT: 6.1